# Patient Record
Sex: FEMALE | Race: WHITE | Employment: UNEMPLOYED | URBAN - METROPOLITAN AREA
[De-identification: names, ages, dates, MRNs, and addresses within clinical notes are randomized per-mention and may not be internally consistent; named-entity substitution may affect disease eponyms.]

---

## 2019-05-20 ENCOUNTER — APPOINTMENT (OUTPATIENT)
Dept: CT IMAGING | Age: 39
DRG: 690 | End: 2019-05-20

## 2019-05-20 ENCOUNTER — HOSPITAL ENCOUNTER (INPATIENT)
Age: 39
LOS: 3 days | Discharge: HOME | DRG: 690 | End: 2019-05-23
Attending: EMERGENCY MEDICINE | Admitting: INTERNAL MEDICINE

## 2019-05-20 DIAGNOSIS — N10 ACUTE PYELONEPHRITIS: Primary | ICD-10-CM

## 2019-05-20 PROBLEM — N12 PYELONEPHRITIS: Status: ACTIVE | Noted: 2019-05-20

## 2019-05-20 LAB
A/G RATIO: 1.3 (ref 1.1–2.2)
ALBUMIN SERPL-MCNC: 4.3 G/DL (ref 3.4–5)
ALP BLD-CCNC: 147 U/L (ref 40–129)
ALT SERPL-CCNC: 15 U/L (ref 10–40)
AMPHETAMINE SCREEN, URINE: ABNORMAL
ANION GAP SERPL CALCULATED.3IONS-SCNC: 15 MMOL/L (ref 3–16)
AST SERPL-CCNC: 22 U/L (ref 15–37)
BACTERIA: ABNORMAL /HPF
BARBITURATE SCREEN URINE: ABNORMAL
BASOPHILS ABSOLUTE: 0.1 K/UL (ref 0–0.2)
BASOPHILS RELATIVE PERCENT: 0.4 %
BENZODIAZEPINE SCREEN, URINE: ABNORMAL
BILIRUB SERPL-MCNC: 1.3 MG/DL (ref 0–1)
BILIRUBIN URINE: NEGATIVE
BLOOD, URINE: ABNORMAL
BUN BLDV-MCNC: 11 MG/DL (ref 7–20)
CALCIUM SERPL-MCNC: 9.5 MG/DL (ref 8.3–10.6)
CANNABINOID SCREEN URINE: POSITIVE
CHLORIDE BLD-SCNC: 94 MMOL/L (ref 99–110)
CLARITY: ABNORMAL
CO2: 22 MMOL/L (ref 21–32)
COCAINE METABOLITE SCREEN URINE: ABNORMAL
COLOR: ABNORMAL
CREAT SERPL-MCNC: 0.5 MG/DL (ref 0.6–1.1)
EOSINOPHILS ABSOLUTE: 0 K/UL (ref 0–0.6)
EOSINOPHILS RELATIVE PERCENT: 0 %
EPITHELIAL CELLS, UA: 1 /HPF (ref 0–5)
ESTIMATED AVERAGE GLUCOSE: 243.2 MG/DL
GFR AFRICAN AMERICAN: >60
GFR NON-AFRICAN AMERICAN: >60
GLOBULIN: 3.2 G/DL
GLUCOSE BLD-MCNC: 230 MG/DL (ref 70–99)
GLUCOSE BLD-MCNC: 231 MG/DL (ref 70–99)
GLUCOSE BLD-MCNC: 237 MG/DL (ref 70–99)
GLUCOSE BLD-MCNC: 290 MG/DL (ref 70–99)
GLUCOSE BLD-MCNC: 358 MG/DL (ref 70–99)
GLUCOSE BLD-MCNC: 361 MG/DL (ref 70–99)
GLUCOSE BLD-MCNC: 404 MG/DL (ref 70–99)
GLUCOSE URINE: 250 MG/DL
HBA1C MFR BLD: 10.1 %
HCG(URINE) PREGNANCY TEST: NEGATIVE
HCT VFR BLD CALC: 40.4 % (ref 36–48)
HEMOGLOBIN: 14 G/DL (ref 12–16)
HYALINE CASTS: 3 /LPF (ref 0–8)
KETONES, URINE: >=80 MG/DL
LEUKOCYTE ESTERASE, URINE: ABNORMAL
LYMPHOCYTES ABSOLUTE: 0.6 K/UL (ref 1–5.1)
LYMPHOCYTES RELATIVE PERCENT: 2.3 %
Lab: ABNORMAL
MCH RBC QN AUTO: 34.4 PG (ref 26–34)
MCHC RBC AUTO-ENTMCNC: 34.5 G/DL (ref 31–36)
MCV RBC AUTO: 99.7 FL (ref 80–100)
METHADONE SCREEN, URINE: ABNORMAL
MICROSCOPIC EXAMINATION: YES
MONOCYTES ABSOLUTE: 0.7 K/UL (ref 0–1.3)
MONOCYTES RELATIVE PERCENT: 2.8 %
NEUTROPHILS ABSOLUTE: 22.6 K/UL (ref 1.7–7.7)
NEUTROPHILS RELATIVE PERCENT: 94.5 %
NITRITE, URINE: NEGATIVE
OPIATE SCREEN URINE: ABNORMAL
OXYCODONE URINE: ABNORMAL
PDW BLD-RTO: 12.8 % (ref 12.4–15.4)
PERFORMED ON: ABNORMAL
PH UA: 8.5
PH UA: 8.5 (ref 5–8)
PHENCYCLIDINE SCREEN URINE: ABNORMAL
PLATELET # BLD: 359 K/UL (ref 135–450)
PMV BLD AUTO: 7.4 FL (ref 5–10.5)
POTASSIUM SERPL-SCNC: 3.5 MMOL/L (ref 3.5–5.1)
PROPOXYPHENE SCREEN: ABNORMAL
PROTEIN UA: >=300 MG/DL
RBC # BLD: 4.05 M/UL (ref 4–5.2)
RBC UA: 47 /HPF (ref 0–4)
SODIUM BLD-SCNC: 131 MMOL/L (ref 136–145)
SPECIFIC GRAVITY UA: 1.02 (ref 1–1.03)
TOTAL PROTEIN: 7.5 G/DL (ref 6.4–8.2)
URINE REFLEX TO CULTURE: YES
URINE TYPE: ABNORMAL
UROBILINOGEN, URINE: 0.2 E.U./DL
WBC # BLD: 24 K/UL (ref 4–11)
WBC UA: >900 /HPF (ref 0–5)

## 2019-05-20 PROCEDURE — 83036 HEMOGLOBIN GLYCOSYLATED A1C: CPT

## 2019-05-20 PROCEDURE — 87086 URINE CULTURE/COLONY COUNT: CPT

## 2019-05-20 PROCEDURE — 80307 DRUG TEST PRSMV CHEM ANLYZR: CPT

## 2019-05-20 PROCEDURE — 87077 CULTURE AEROBIC IDENTIFY: CPT

## 2019-05-20 PROCEDURE — 6370000000 HC RX 637 (ALT 250 FOR IP): Performed by: INTERNAL MEDICINE

## 2019-05-20 PROCEDURE — 74176 CT ABD & PELVIS W/O CONTRAST: CPT

## 2019-05-20 PROCEDURE — 2580000003 HC RX 258: Performed by: EMERGENCY MEDICINE

## 2019-05-20 PROCEDURE — 6360000002 HC RX W HCPCS: Performed by: PHYSICIAN ASSISTANT

## 2019-05-20 PROCEDURE — 6370000000 HC RX 637 (ALT 250 FOR IP): Performed by: PHYSICIAN ASSISTANT

## 2019-05-20 PROCEDURE — 85025 COMPLETE CBC W/AUTO DIFF WBC: CPT

## 2019-05-20 PROCEDURE — 96375 TX/PRO/DX INJ NEW DRUG ADDON: CPT

## 2019-05-20 PROCEDURE — 87186 SC STD MICRODIL/AGAR DIL: CPT

## 2019-05-20 PROCEDURE — 6360000002 HC RX W HCPCS: Performed by: EMERGENCY MEDICINE

## 2019-05-20 PROCEDURE — 1200000000 HC SEMI PRIVATE

## 2019-05-20 PROCEDURE — 99285 EMERGENCY DEPT VISIT HI MDM: CPT

## 2019-05-20 PROCEDURE — 80053 COMPREHEN METABOLIC PANEL: CPT

## 2019-05-20 PROCEDURE — 81001 URINALYSIS AUTO W/SCOPE: CPT

## 2019-05-20 PROCEDURE — 36415 COLL VENOUS BLD VENIPUNCTURE: CPT

## 2019-05-20 PROCEDURE — 96374 THER/PROPH/DIAG INJ IV PUSH: CPT

## 2019-05-20 PROCEDURE — 2580000003 HC RX 258: Performed by: INTERNAL MEDICINE

## 2019-05-20 PROCEDURE — 96361 HYDRATE IV INFUSION ADD-ON: CPT

## 2019-05-20 PROCEDURE — 84703 CHORIONIC GONADOTROPIN ASSAY: CPT

## 2019-05-20 PROCEDURE — 6360000002 HC RX W HCPCS: Performed by: INTERNAL MEDICINE

## 2019-05-20 RX ORDER — SODIUM CHLORIDE 0.9 % (FLUSH) 0.9 %
10 SYRINGE (ML) INJECTION PRN
Status: DISCONTINUED | OUTPATIENT
Start: 2019-05-20 | End: 2019-05-21 | Stop reason: SDUPTHER

## 2019-05-20 RX ORDER — ONDANSETRON 2 MG/ML
4 INJECTION INTRAMUSCULAR; INTRAVENOUS ONCE
Status: COMPLETED | OUTPATIENT
Start: 2019-05-20 | End: 2019-05-20

## 2019-05-20 RX ORDER — FAMOTIDINE 20 MG/1
20 TABLET, FILM COATED ORAL 2 TIMES DAILY
Status: DISCONTINUED | OUTPATIENT
Start: 2019-05-20 | End: 2019-05-21

## 2019-05-20 RX ORDER — 0.9 % SODIUM CHLORIDE 0.9 %
1000 INTRAVENOUS SOLUTION INTRAVENOUS ONCE
Status: COMPLETED | OUTPATIENT
Start: 2019-05-20 | End: 2019-05-20

## 2019-05-20 RX ORDER — KETOROLAC TROMETHAMINE 30 MG/ML
30 INJECTION, SOLUTION INTRAMUSCULAR; INTRAVENOUS EVERY 6 HOURS
Status: DISCONTINUED | OUTPATIENT
Start: 2019-05-20 | End: 2019-05-21

## 2019-05-20 RX ORDER — SODIUM CHLORIDE 9 MG/ML
INJECTION, SOLUTION INTRAVENOUS CONTINUOUS
Status: DISCONTINUED | OUTPATIENT
Start: 2019-05-20 | End: 2019-05-21

## 2019-05-20 RX ORDER — KETOROLAC TROMETHAMINE 30 MG/ML
30 INJECTION, SOLUTION INTRAMUSCULAR; INTRAVENOUS ONCE
Status: COMPLETED | OUTPATIENT
Start: 2019-05-20 | End: 2019-05-20

## 2019-05-20 RX ORDER — ONDANSETRON 2 MG/ML
4 INJECTION INTRAMUSCULAR; INTRAVENOUS EVERY 6 HOURS PRN
Status: DISCONTINUED | OUTPATIENT
Start: 2019-05-20 | End: 2019-05-23 | Stop reason: HOSPADM

## 2019-05-20 RX ORDER — SODIUM CHLORIDE 9 MG/ML
INJECTION, SOLUTION INTRAVENOUS
Status: DISPENSED
Start: 2019-05-20 | End: 2019-05-20

## 2019-05-20 RX ORDER — DEXTROSE MONOHYDRATE 25 G/50ML
12.5 INJECTION, SOLUTION INTRAVENOUS PRN
Status: DISCONTINUED | OUTPATIENT
Start: 2019-05-20 | End: 2019-05-22 | Stop reason: SDUPTHER

## 2019-05-20 RX ORDER — NICOTINE POLACRILEX 4 MG
15 LOZENGE BUCCAL PRN
Status: DISCONTINUED | OUTPATIENT
Start: 2019-05-20 | End: 2019-05-22 | Stop reason: SDUPTHER

## 2019-05-20 RX ORDER — SODIUM CHLORIDE 0.9 % (FLUSH) 0.9 %
10 SYRINGE (ML) INJECTION EVERY 12 HOURS SCHEDULED
Status: DISCONTINUED | OUTPATIENT
Start: 2019-05-20 | End: 2019-05-21 | Stop reason: SDUPTHER

## 2019-05-20 RX ORDER — DEXTROSE MONOHYDRATE 50 MG/ML
100 INJECTION, SOLUTION INTRAVENOUS PRN
Status: DISCONTINUED | OUTPATIENT
Start: 2019-05-20 | End: 2019-05-22 | Stop reason: SDUPTHER

## 2019-05-20 RX ADMIN — SODIUM CHLORIDE 1000 ML: 9 INJECTION, SOLUTION INTRAVENOUS at 01:21

## 2019-05-20 RX ADMIN — FAMOTIDINE 20 MG: 20 TABLET ORAL at 08:37

## 2019-05-20 RX ADMIN — INSULIN LISPRO 2 UNITS: 100 INJECTION, SOLUTION INTRAVENOUS; SUBCUTANEOUS at 21:22

## 2019-05-20 RX ADMIN — KETOROLAC TROMETHAMINE 30 MG: 30 INJECTION, SOLUTION INTRAMUSCULAR; INTRAVENOUS at 21:21

## 2019-05-20 RX ADMIN — SODIUM CHLORIDE: 9 INJECTION, SOLUTION INTRAVENOUS at 05:17

## 2019-05-20 RX ADMIN — INSULIN LISPRO 6 UNITS: 100 INJECTION, SOLUTION INTRAVENOUS; SUBCUTANEOUS at 12:32

## 2019-05-20 RX ADMIN — INSULIN LISPRO 10 UNITS: 100 INJECTION, SOLUTION INTRAVENOUS; SUBCUTANEOUS at 08:37

## 2019-05-20 RX ADMIN — KETOROLAC TROMETHAMINE 30 MG: 30 INJECTION, SOLUTION INTRAMUSCULAR at 01:23

## 2019-05-20 RX ADMIN — KETOROLAC TROMETHAMINE 30 MG: 30 INJECTION, SOLUTION INTRAMUSCULAR; INTRAVENOUS at 09:48

## 2019-05-20 RX ADMIN — Medication 1 G: at 04:24

## 2019-05-20 RX ADMIN — ONDANSETRON 4 MG: 2 INJECTION INTRAMUSCULAR; INTRAVENOUS at 09:48

## 2019-05-20 RX ADMIN — Medication 10 ML: at 21:22

## 2019-05-20 RX ADMIN — FAMOTIDINE 20 MG: 20 TABLET ORAL at 21:21

## 2019-05-20 RX ADMIN — KETOROLAC TROMETHAMINE 30 MG: 30 INJECTION, SOLUTION INTRAMUSCULAR; INTRAVENOUS at 16:11

## 2019-05-20 RX ADMIN — INSULIN LISPRO 6 UNITS: 100 INJECTION, SOLUTION INTRAVENOUS; SUBCUTANEOUS at 16:50

## 2019-05-20 RX ADMIN — ONDANSETRON 4 MG: 2 INJECTION INTRAMUSCULAR; INTRAVENOUS at 01:22

## 2019-05-20 RX ADMIN — SODIUM CHLORIDE: 9 INJECTION, SOLUTION INTRAVENOUS at 21:30

## 2019-05-20 RX ADMIN — ONDANSETRON 4 MG: 2 INJECTION INTRAMUSCULAR; INTRAVENOUS at 16:52

## 2019-05-20 RX ADMIN — INSULIN GLARGINE 5 UNITS: 100 INJECTION, SOLUTION SUBCUTANEOUS at 12:34

## 2019-05-20 ASSESSMENT — PAIN SCALES - GENERAL
PAINLEVEL_OUTOF10: 4
PAINLEVEL_OUTOF10: 4
PAINLEVEL_OUTOF10: 8
PAINLEVEL_OUTOF10: 4
PAINLEVEL_OUTOF10: 6
PAINLEVEL_OUTOF10: 4
PAINLEVEL_OUTOF10: 3

## 2019-05-20 ASSESSMENT — PAIN DESCRIPTION - LOCATION
LOCATION: FLANK
LOCATION: BACK;FLANK
LOCATION: BACK;FLANK

## 2019-05-20 ASSESSMENT — PAIN DESCRIPTION - FREQUENCY
FREQUENCY: INTERMITTENT
FREQUENCY: INTERMITTENT

## 2019-05-20 ASSESSMENT — PAIN DESCRIPTION - ORIENTATION
ORIENTATION: RIGHT

## 2019-05-20 ASSESSMENT — PAIN DESCRIPTION - PAIN TYPE
TYPE: ACUTE PAIN

## 2019-05-20 ASSESSMENT — PAIN DESCRIPTION - DESCRIPTORS
DESCRIPTORS: ACHING
DESCRIPTORS: ACHING

## 2019-05-20 NOTE — PROGRESS NOTES
Admission complete. Patient alert and oriented x4. Independent in room. No nausea at this time. Resting quietly in bed. The care plan and education has been reviewed and mutually agreed upon with the patient.      Electronically signed by Sharon Angelo RN on 5/20/2019 at 5:52 AM

## 2019-05-20 NOTE — PROGRESS NOTES
4 Eyes Skin Assessment     The patient is being assess for  Admission    I agree that 2 RN's have performed a thorough Head to Toe Skin Assessment on the patient. ALL assessment sites listed below have been assessed. Areas assessed by both nurses:   [x]   Head, Face, and Ears   [x]   Shoulders, Back, and Chest  [x]   Arms, Elbows, and Hands   [x]   Coccyx, Sacrum, and IschIum  [x]   Legs, Feet, and Heels        Does the Patient have Skin Breakdown?   No         Cody Prevention initiated:  No   Wound Care Orders initiated:  No      [facility] nurse consulted for Pressure Injury (Stage 3,4, Unstageable, DTI, NWPT, and Complex wounds), New and Established Ostomies:  No      Nurse 1 eSignature: Electronically signed by Ravi Weiss RN on 5/20/19 at 5:53 AM    **SHARE this note so that the co-signing nurse is able to place an eSignature**    Nurse 2 eSignature: Electronically signed by Eldon Walker RN on 5/20/2019 at 5:57 AM

## 2019-05-20 NOTE — PLAN OF CARE
Problem: Pain:  Goal: Pain level will decrease  Description  Pain level will decrease  Outcome: Ongoing  Goal: Control of acute pain  Description  Control of acute pain  Outcome: Ongoing  Goal: Control of chronic pain  Description  Control of chronic pain  Outcome: Ongoing     Problem: Falls - Risk of:  Goal: Will remain free from falls  Description  Will remain free from falls  Outcome: Ongoing  Goal: Absence of physical injury  Description  Absence of physical injury  Outcome: Ongoing     Problem: Serum Glucose Level - Abnormal:  Goal: Ability to maintain appropriate glucose levels will improve  Description  Ability to maintain appropriate glucose levels will improve  Outcome: Ongoing     Problem: Sensory Perception - Impaired:  Goal: Ability to maintain a stable neurologic state will improve  Description  Ability to maintain a stable neurologic state will improve  Outcome: Ongoing     Problem: Sensory:  Goal: General experience of comfort will improve  Description  General experience of comfort will improve  Outcome: Ongoing     Problem: Urinary Elimination:  Goal: Signs and symptoms of infection will decrease  Description  Signs and symptoms of infection will decrease  Outcome: Ongoing  Goal: Ability to reestablish a normal urinary elimination pattern will improve - after catheter removal  Description  Ability to reestablish a normal urinary elimination pattern will improve  Outcome: Ongoing

## 2019-05-20 NOTE — ED PROVIDER NOTES
[address] [facility]  [facility]      Pt Name: Heladio Du  MRN: [id number]  [place] 1980  Date ofevaluation: 5/19/2019  Provider: Marta Desai, [address] [place] [address]       Chief Complaint   Patient presents with    Flank Pain     Patient presents to ER with complaints of right flank pain with nausea/vomiting. HISTORY OF PRESENT ILLNESS   (Location/Symptom, Timing/Onset,Context/Setting, Quality, Duration, Modifying Factors, Severity)  Note limiting factors. HPI    Heladio Du is a 34 y.o. female who presents to the emergency department with a chief complaint of right flank and right lower quadrant abdominal pain that started tonight at around 1500. is gradually worsened . is an 8 at 10 intensity pain associated nausea, vomiting multiple times. no diarrhea or constipation . no dysuria hematuria urgency or frequency or urination. sHe states she  Has been feeling some twinges her back over the last 2 days time but developed abdominal pain today. she is diabetic        Nursing Notes were reviewed. REVIEW OF SYSTEMS    (2-9 systems for level 4, 10 or more for level 5)     Review of Systems  General:Denies fever, headache  ENT: no runny nose, sore throat   Respiratory:  no cough, chest congestion or shortness of breath  Cardiovascular: no chest pain or palpitations  GI: no  constipation or diarrhea  : no dysuria, urgency, frequency  Musculoskelatal: no neck pain  Neurological:  No weakness, numbness no speech or memory problems. Remainder of systems reviewed and negative. Nursing notes reviewed and I agree, except as otherwise noted. vitals signs reviewed. Allergies, Past Medical and Surgical History reviewed. Social andFamily History reviewed. and I agree, except as otherwise noted       Except as noted above the remainder of the review of systems was reviewed and negative.        PAST MEDICAL HISTORY     Past Medical History:   Diagnosis Date  Diabetes mellitus ([facility] [address]          SURGICAL HISTORY       Past Surgical History:   Procedure Laterality Date     SECTION           CURRENT MEDICATIONS       Previous Medications    INSULIN DETEMIR (LEVEMIR SC)    Inject into the skin       ALLERGIES     Lisinopril    FAMILY HISTORY     History reviewed. No pertinent family history.        SOCIAL HISTORY       Social History     Socioeconomic History    Marital status:      Spouse name: None    Number of children: None    Years of education: None    Highest education level: None   Occupational History    None   Social Needs    Financial resource strain: None    Food insecurity:     Worry: None     Inability: None    Transportation needs:     Medical: None     Non-medical: None   Tobacco Use    Smoking status: Current Every Day Smoker     Packs/day: 0.50     Types: Cigarettes    Smokeless tobacco: Never Used   Substance and Sexual Activity    Alcohol use: Yes     Comment: occasional    Drug use: No    Sexual activity: None   Lifestyle    Physical activity:     Days per week: None     Minutes per session: None    Stress: None   Relationships    Social connections:     Talks on phone: None     Gets together: None     Attends Evangelical service: None     Active member of club or organization: None     Attends meetings of clubs or organizations: None     Relationship status: None    Intimate partner violence:     Fear of current or ex partner: None     Emotionally abused: None     Physically abused: None     Forced sexual activity: None   Other Topics Concern    None   Social History Narrative    None       SCREENINGS             PHYSICAL EXAM    (up to 7 for level 4, 8 or more for level 5)     ED Triage Vitals [19 0008]   BP Temp Temp Source Pulse Resp SpO2 Height Weight   (!) 157/73 98.1 °F (36.7 °C) Oral 90 18 97 % 5' 1\" (1.549 m) 110 lb (49.9 kg)       Physical Exam  GENERAL: Patient appeared well-developed, well-nourished, vital signs reviewed. MENTAL STATUS: Patient is awake and alert   HEAD AND FACE :Head is normal cephalic. Face normal appearance  EYES: eyes lids and conjunctiva appear normal. Pupils reactive  ENT :ears and nose appear normal externally. CV: Heart regular rate and rhythm without murmur,  LUNGS: Lungs are clear to auscultation without wheezing, rales, or rhonchi. Normal respiratory effort. CHEST WALL: normal appearance. normal motion  ABDOMEN: Abdomen is soft to palpation. Right lower quadrant tenderness to palpation. No guarding rigidity or rebound Bowel sounds are present in all 4 quadrants No masses palpable. No CV tenderness present. No Bruits present. EXTREMITIES: Extremities moves all 4 Extremities without any weakness  There is no calf tenderness or lower extremity edema   Back: there is right paravertebral muscle tenderness to palpation. there is no spinous process tenderness to palpation. PSYCHIATRIC:mood and affect normal    NEUROLOGIC: no weakness or numbness noted,  no meningeal signs      This is a computerized dictation. I have made every effort to proofread this chart, however, transcription errors may exist.   Diagnosis  Acute pyelonephritis    Interpretation per the Radiologist below, if available at the time of this note:    [address]    (Results Pending)           LABS:  Labs Reviewed   URINE RT REFLEX TO CULTURE   PREGNANCY, URINE   COMPREHENSIVE METABOLIC PANEL   CBC WITH AUTO DIFFERENTIAL   URINE DRUG SCREEN       All other labs were within normal range or not returned as of this dictation. EMERGENCY DEPARTMENT COURSE and DIFFERENTIAL DIAGNOSIS/MDM:   Vitals:    Vitals:    05/20/19 0008   BP: (!) 157/73   Pulse: 90   Resp: 18   Temp: 98.1 °F (36.7 °C)   TempSrc: Oral   SpO2: 97%   Weight: 110 lb (49.9 kg)   Height: 5' 1\" (1.549 m)           MDM  Patient presents with flank pain nausea vomiting.   White count elevated 24,000 she has urinary tract infection probable pyelonephritis. Will contact the hospitalist for admission and further treatment    REASSESSMENT        CONSULTS:  None    Procedures:  Unless otherwise noted below, none     Procedures    FINAL IMPRESSION    Urinary tract infection, pyelonephritis    No diagnosis found. DISPOSITION/PLAN   DISPOSITION        PATIENT REFERRED TO:  No follow-up provider specified.     DISCHARGE MEDICATIONS:  New Prescriptions    No medications on file          (Please note that portions of this note were completed with a voice recognition program.  Efforts were made to edit thedictations but occasionally words are mis-transcribed.)    Warren Serna DO (electronically signed)Emergency Physician       Warren Serna DO  05/20/19 [address] [place] [address]  05/29/19 3732

## 2019-05-20 NOTE — PROGRESS NOTES
Assessment completed and documented. Pt resting in bed. No needs at thid time. Call light in reach will monitor.

## 2019-05-20 NOTE — ED NOTES
Pt a, a/o x 3, resps easy. Reports pain in right lower back remains at 4/10 and with no further n/v.  VS as noted. To be admitted.      Edith Gross RN  05/20/19 3129

## 2019-05-20 NOTE — ED NOTES
Pt a, a/o x 3, resps easy, states, \"That's starting to do the trick\" and reports pain down to a 4/10 at present with no n/v.  States, \"It's coming down nicely. \"  VSS as noted.      Xochilt Correa RN  05/20/19 1066

## 2019-05-20 NOTE — ED NOTES
Pt reports pain is \"going down and is a 5/10\" with no further n/v. To CT via wheelchair accompanied by [facility], [address].        Haylie Metcalf RN  05/20/19 7944

## 2019-05-20 NOTE — H&P
Hospital Medicine History & Physical      PCP: No primary care provider on file. Date of Admission: 2019    Date of Service: Pt seen/examined on 2019and Admitted to Inpatient    Chief Complaint:  Right flank pain      History Of Present Illness:    Karly Cortez is a 34 y.o. female who presents to the emergency department with a chief complaint of right flank and right lower quadrant abdominal pain that started tonight at around 1500. is gradually worsened . is an 8 at 10 intensity pain associated nausea, vomiting multiple times. no diarrhea or constipation . no dysuria hematuria urgency or frequency or urination. sHe states she  Has been feeling some twinges her back over the last 2 days time but developed abdominal pain today. she is diabetic and takes insulin at home       Past Medical History:        Diagnosis Date    Diabetes mellitus ([address]        Past Surgical History:        Procedure Laterality Date     SECTION         Medications Prior to Admission:    Prior to Admission medications    Medication Sig Start Date End Date Taking? Authorizing Provider   Insulin Detemir (LEVEMIR SC) Inject into the skin   Yes Historical Provider, MD       Allergies:  Lisinopril    Social History:  The patient currently lives at home     TOBACCO:   reports that she has been smoking cigarettes. She has been smoking about 0.50 packs per day. She has never used smokeless tobacco.  ETOH:   reports that she drinks alcohol. Family History:  Reviewed in detail and negative for DM, Early CAD, Cancer, CVA. Positive as follows:    History reviewed. No pertinent family history. REVIEW OF SYSTEMS:   Positive for flank pain, fever, and reportedly some nausea and vomiting. and as noted in the HPI. All other systems reviewed and negative.     PHYSICAL EXAM:    /76 Pulse 94   Temp 98.2 °F (36.8 °C) (Tympanic)   Resp 16   Ht 5' 1\" (1.549 m)   Wt 110 lb (49.9 kg)   LMP 05/08/2019   SpO2 96%   BMI 20.78 kg/m²     General appearance: No apparent distress appears stated age and cooperative. HEENT Normal cephalic, atraumatic without obvious deformity. Pupils equal, round, and reactive to light. Extra ocular muscles intact. Conjunctivae/corneas clear. Neck: Supple, No jugular venous distention/bruits. Trachea midline without thyromegaly or adenopathy with full range of motion. Lungs: Clear to auscultation, bilaterally without Rales/Wheezes/Rhonchi with good respiratory effort. Heart: Regular rate and rhythm with Normal S1/S2 without murmurs, rubs or gallops, point of maximum impulse non-displaced  Abdomen: Soft, Mildly tender on the right and the right flank  Extremities: No clubbing, cyanosis, or edema bilaterally. Full range of motion without deformity and normal gait intact. Skin: Skin color, texture, turgor normal.  No rashes or lesions. Neurologic: Alert and oriented X 3, neurovascularly intact with sensory/motor intact upper extremities/lower extremities, bilaterally. Cranial nerves: II-XII intact, grossly non-focal.  Mental status: Alert, oriented, thought content appropriate. Capillary Refill: Acceptable  < 3 seconds  Peripheral Pulses: +3 Easily felt, not easily obliterated with pressure      CXR:  I have reviewed the CXR with the following interpretation: clear   CT scan  - no sign of kidney stones  EKG:  I have reviewed the EKG with the following interpretation: none    CBC   Recent Labs     05/20/19  0120   WBC 24.0*   HGB 14.0   HCT 40.4         RENAL  Recent Labs     05/20/19  0120   *   K 3.5   CL 94*   CO2 22   BUN 11   CREATININE 0.5*     LFT'S  Recent Labs     05/20/19  0120   AST 22   ALT 15   BILITOT 1.3*   ALKPHOS 147*     COAG  No results for input(s): INR in the last 72 hours.   CARDIAC ENZYMES  No results for input(s): CKTOTAL, CKMB, CKMBINDEX, TROPONINI in the last 72 hours. U/A:    Lab Results   Component Value Date    COLORU DK YELLOW 05/20/2019    WBCUA >900 05/20/2019    RBCUA 47 05/20/2019    BACTERIA 4+ 05/20/2019    CLARITYU TURBID 05/20/2019    SPECGRAV 1.021 05/20/2019    LEUKOCYTESUR LARGE 05/20/2019    BLOODU MODERATE 05/20/2019    GLUCOSEU 250 05/20/2019       ABG  No results found for: HKH4BRC, BEART, W6ACKPEI, PHART, THGBART, TDJ6RMW, PO2ART, WAU2UIZ        Active Hospital Problems    Diagnosis Date Noted    Pyelonephritis [N12] 05/20/2019         PHYSICIANS CERTIFICATION:    I certify that Hector Parmar is expected to be hospitalized for greater than 2 midnights based on the following assessment and plan:      ASSESSMENT/PLAN:    1. Acute pyelonephritis. - flank pain, elevated white count, severe UTI on urine dip  - clinically has despite negative ct scan  - started on IV rocephin  - await urine culture and sensitivity    2. Diabetes mellitus  - she takes levemir at home. - started on sliding scale in house. - she will need her levemir started     DVT Prophylaxis: lovenox  Diet: DIET CARB CONTROL;  Code Status: Full Code  PT/OT Eval Status: not at this time     Dispo - inpatient       Allyson Omer MD    Thank you No primary care provider on file. for the opportunity to be involved in this patient's care. If you have any questions or concerns please feel free to contact me at [phone].

## 2019-05-21 PROBLEM — D72.829 LEUKOCYTOSIS: Status: ACTIVE | Noted: 2019-05-21

## 2019-05-21 PROBLEM — A41.9 SEPSIS (HCC): Status: ACTIVE | Noted: 2019-05-21

## 2019-05-21 PROBLEM — E87.5 HYPERKALEMIA: Status: ACTIVE | Noted: 2019-05-21

## 2019-05-21 PROBLEM — E10.10 DKA, TYPE 1 (HCC): Status: ACTIVE | Noted: 2019-05-21

## 2019-05-21 PROBLEM — R11.2 INTRACTABLE NAUSEA AND VOMITING: Status: ACTIVE | Noted: 2019-05-21

## 2019-05-21 LAB
ANION GAP SERPL CALCULATED.3IONS-SCNC: 10 MMOL/L (ref 3–16)
ANION GAP SERPL CALCULATED.3IONS-SCNC: 15 MMOL/L (ref 3–16)
ANION GAP SERPL CALCULATED.3IONS-SCNC: 18 MMOL/L (ref 3–16)
ANION GAP SERPL CALCULATED.3IONS-SCNC: 25 MMOL/L (ref 3–16)
ANION GAP SERPL CALCULATED.3IONS-SCNC: 30 MMOL/L (ref 3–16)
BASE EXCESS ARTERIAL: -31.2 MMOL/L (ref -3–3)
BETA-HYDROXYBUTYRATE: >8 MMOL/L (ref 0–0.27)
BUN BLDV-MCNC: 13 MG/DL (ref 7–20)
BUN BLDV-MCNC: 15 MG/DL (ref 7–20)
BUN BLDV-MCNC: 20 MG/DL (ref 7–20)
BUN BLDV-MCNC: 21 MG/DL (ref 7–20)
BUN BLDV-MCNC: 22 MG/DL (ref 7–20)
CALCIUM SERPL-MCNC: 7.9 MG/DL (ref 8.3–10.6)
CALCIUM SERPL-MCNC: 8.1 MG/DL (ref 8.3–10.6)
CALCIUM SERPL-MCNC: 8.3 MG/DL (ref 8.3–10.6)
CALCIUM SERPL-MCNC: 8.5 MG/DL (ref 8.3–10.6)
CALCIUM SERPL-MCNC: 8.7 MG/DL (ref 8.3–10.6)
CARBOXYHEMOGLOBIN ARTERIAL: 0.7 % (ref 0–1.5)
CHLORIDE BLD-SCNC: 100 MMOL/L (ref 99–110)
CHLORIDE BLD-SCNC: 107 MMOL/L (ref 99–110)
CHLORIDE BLD-SCNC: 109 MMOL/L (ref 99–110)
CHLORIDE BLD-SCNC: 109 MMOL/L (ref 99–110)
CHLORIDE BLD-SCNC: 95 MMOL/L (ref 99–110)
CO2: 12 MMOL/L (ref 21–32)
CO2: 6 MMOL/L (ref 21–32)
CO2: 8 MMOL/L (ref 21–32)
CO2: <2 MMOL/L (ref 21–32)
CO2: <2 MMOL/L (ref 21–32)
CREAT SERPL-MCNC: 0.7 MG/DL (ref 0.6–1.1)
CREAT SERPL-MCNC: 0.7 MG/DL (ref 0.6–1.1)
CREAT SERPL-MCNC: 0.8 MG/DL (ref 0.6–1.1)
CREAT SERPL-MCNC: 0.9 MG/DL (ref 0.6–1.1)
CREAT SERPL-MCNC: 1 MG/DL (ref 0.6–1.1)
GFR AFRICAN AMERICAN: >60
GFR NON-AFRICAN AMERICAN: >60
GLUCOSE BLD-MCNC: 127 MG/DL (ref 70–99)
GLUCOSE BLD-MCNC: 148 MG/DL (ref 70–99)
GLUCOSE BLD-MCNC: 150 MG/DL (ref 70–99)
GLUCOSE BLD-MCNC: 154 MG/DL (ref 70–99)
GLUCOSE BLD-MCNC: 157 MG/DL (ref 70–99)
GLUCOSE BLD-MCNC: 171 MG/DL (ref 70–99)
GLUCOSE BLD-MCNC: 174 MG/DL (ref 70–99)
GLUCOSE BLD-MCNC: 174 MG/DL (ref 70–99)
GLUCOSE BLD-MCNC: 177 MG/DL (ref 70–99)
GLUCOSE BLD-MCNC: 179 MG/DL (ref 70–99)
GLUCOSE BLD-MCNC: 187 MG/DL (ref 70–99)
GLUCOSE BLD-MCNC: 204 MG/DL (ref 70–99)
GLUCOSE BLD-MCNC: 205 MG/DL (ref 70–99)
GLUCOSE BLD-MCNC: 253 MG/DL (ref 70–99)
GLUCOSE BLD-MCNC: 340 MG/DL (ref 70–99)
GLUCOSE BLD-MCNC: 397 MG/DL (ref 70–99)
GLUCOSE BLD-MCNC: 406 MG/DL (ref 70–99)
GLUCOSE BLD-MCNC: 436 MG/DL (ref 70–99)
GLUCOSE BLD-MCNC: 445 MG/DL (ref 70–99)
HCO3 ARTERIAL: 1.6 MMOL/L (ref 21–29)
HCT VFR BLD CALC: 44.3 % (ref 36–48)
HEMOGLOBIN, ART, EXTENDED: 12.8 G/DL (ref 12–16)
HEMOGLOBIN: 13.7 G/DL (ref 12–16)
LACTIC ACID, SEPSIS: 1.8 MMOL/L (ref 0.4–1.9)
MAGNESIUM: 1.9 MG/DL (ref 1.8–2.4)
MAGNESIUM: 2.1 MG/DL (ref 1.8–2.4)
MAGNESIUM: 2.2 MG/DL (ref 1.8–2.4)
MCH RBC QN AUTO: 33.8 PG (ref 26–34)
MCHC RBC AUTO-ENTMCNC: 30.9 G/DL (ref 31–36)
MCV RBC AUTO: 109.4 FL (ref 80–100)
METHEMOGLOBIN ARTERIAL: 0.3 %
O2 CONTENT ARTERIAL: 18 ML/DL
O2 SAT, ARTERIAL: 98.3 %
O2 THERAPY: ABNORMAL
PCO2 ARTERIAL: ABNORMAL MMHG (ref 35–45)
PDW BLD-RTO: 13.9 % (ref 12.4–15.4)
PERFORMED ON: ABNORMAL
PH ARTERIAL: 6.83 (ref 7.35–7.45)
PHOSPHORUS: 0.6 MG/DL (ref 2.5–4.9)
PLATELET # BLD: 374 K/UL (ref 135–450)
PMV BLD AUTO: 8.1 FL (ref 5–10.5)
PO2 ARTERIAL: 164 MMHG (ref 75–108)
POTASSIUM REFLEX MAGNESIUM: 6.3 MMOL/L (ref 3.5–5.1)
POTASSIUM REFLEX MAGNESIUM: 6.3 MMOL/L (ref 3.5–5.1)
POTASSIUM SERPL-SCNC: 4.5 MMOL/L (ref 3.5–5.1)
POTASSIUM SERPL-SCNC: 5 MMOL/L (ref 3.5–5.1)
POTASSIUM SERPL-SCNC: 5.1 MMOL/L (ref 3.5–5.1)
RBC # BLD: 4.05 M/UL (ref 4–5.2)
REASON FOR REJECTION: NORMAL
REJECTED TEST: NORMAL
SODIUM BLD-SCNC: 127 MMOL/L (ref 136–145)
SODIUM BLD-SCNC: 127 MMOL/L (ref 136–145)
SODIUM BLD-SCNC: 130 MMOL/L (ref 136–145)
SODIUM BLD-SCNC: 131 MMOL/L (ref 136–145)
SODIUM BLD-SCNC: 133 MMOL/L (ref 136–145)
TCO2 ARTERIAL: 4.2 MMOL/L
WBC # BLD: 31.3 K/UL (ref 4–11)

## 2019-05-21 PROCEDURE — 6360000002 HC RX W HCPCS: Performed by: INTERNAL MEDICINE

## 2019-05-21 PROCEDURE — 2580000003 HC RX 258

## 2019-05-21 PROCEDURE — 2500000003 HC RX 250 WO HCPCS: Performed by: INTERNAL MEDICINE

## 2019-05-21 PROCEDURE — 2580000003 HC RX 258: Performed by: PHYSICIAN ASSISTANT

## 2019-05-21 PROCEDURE — 6360000002 HC RX W HCPCS: Performed by: PHYSICIAN ASSISTANT

## 2019-05-21 PROCEDURE — 36415 COLL VENOUS BLD VENIPUNCTURE: CPT

## 2019-05-21 PROCEDURE — 2000000000 HC ICU R&B

## 2019-05-21 PROCEDURE — C1751 CATH, INF, PER/CENT/MIDLINE: HCPCS

## 2019-05-21 PROCEDURE — 85027 COMPLETE CBC AUTOMATED: CPT

## 2019-05-21 PROCEDURE — C9113 INJ PANTOPRAZOLE SODIUM, VIA: HCPCS | Performed by: INTERNAL MEDICINE

## 2019-05-21 PROCEDURE — 82010 KETONE BODYS QUAN: CPT

## 2019-05-21 PROCEDURE — 6370000000 HC RX 637 (ALT 250 FOR IP): Performed by: INTERNAL MEDICINE

## 2019-05-21 PROCEDURE — 84100 ASSAY OF PHOSPHORUS: CPT

## 2019-05-21 PROCEDURE — 80048 BASIC METABOLIC PNL TOTAL CA: CPT

## 2019-05-21 PROCEDURE — 83605 ASSAY OF LACTIC ACID: CPT

## 2019-05-21 PROCEDURE — 99255 IP/OBS CONSLTJ NEW/EST HI 80: CPT | Performed by: INTERNAL MEDICINE

## 2019-05-21 PROCEDURE — 83735 ASSAY OF MAGNESIUM: CPT

## 2019-05-21 PROCEDURE — 2580000003 HC RX 258: Performed by: INTERNAL MEDICINE

## 2019-05-21 PROCEDURE — 36569 INSJ PICC 5 YR+ W/O IMAGING: CPT

## 2019-05-21 PROCEDURE — 82803 BLOOD GASES ANY COMBINATION: CPT

## 2019-05-21 PROCEDURE — 87040 BLOOD CULTURE FOR BACTERIA: CPT

## 2019-05-21 PROCEDURE — 36600 WITHDRAWAL OF ARTERIAL BLOOD: CPT

## 2019-05-21 PROCEDURE — 02HV33Z INSERTION OF INFUSION DEVICE INTO SUPERIOR VENA CAVA, PERCUTANEOUS APPROACH: ICD-10-PCS | Performed by: INTERNAL MEDICINE

## 2019-05-21 PROCEDURE — 6370000000 HC RX 637 (ALT 250 FOR IP): Performed by: PHYSICIAN ASSISTANT

## 2019-05-21 RX ORDER — LIDOCAINE HYDROCHLORIDE 10 MG/ML
5 INJECTION, SOLUTION EPIDURAL; INFILTRATION; INTRACAUDAL; PERINEURAL ONCE
Status: DISCONTINUED | OUTPATIENT
Start: 2019-05-21 | End: 2019-05-23 | Stop reason: HOSPADM

## 2019-05-21 RX ORDER — SODIUM CHLORIDE 0.9 % (FLUSH) 0.9 %
10 SYRINGE (ML) INJECTION PRN
Status: DISCONTINUED | OUTPATIENT
Start: 2019-05-21 | End: 2019-05-21 | Stop reason: SDUPTHER

## 2019-05-21 RX ORDER — SODIUM CHLORIDE 9 MG/ML
INJECTION, SOLUTION INTRAVENOUS CONTINUOUS
Status: DISCONTINUED | OUTPATIENT
Start: 2019-05-21 | End: 2019-05-22

## 2019-05-21 RX ORDER — SODIUM CHLORIDE 0.9 % (FLUSH) 0.9 %
10 SYRINGE (ML) INJECTION EVERY 12 HOURS SCHEDULED
Status: DISCONTINUED | OUTPATIENT
Start: 2019-05-21 | End: 2019-05-21 | Stop reason: SDUPTHER

## 2019-05-21 RX ORDER — KETOROLAC TROMETHAMINE 30 MG/ML
30 INJECTION, SOLUTION INTRAMUSCULAR; INTRAVENOUS EVERY 6 HOURS
Status: CANCELLED | OUTPATIENT
Start: 2019-05-21 | End: 2019-05-21

## 2019-05-21 RX ORDER — 0.9 % SODIUM CHLORIDE 0.9 %
15 INTRAVENOUS SOLUTION INTRAVENOUS ONCE
Status: CANCELLED | OUTPATIENT
Start: 2019-05-21 | End: 2019-05-21

## 2019-05-21 RX ORDER — SODIUM CHLORIDE 0.9 % (FLUSH) 0.9 %
10 SYRINGE (ML) INJECTION PRN
Status: DISCONTINUED | OUTPATIENT
Start: 2019-05-21 | End: 2019-05-23 | Stop reason: HOSPADM

## 2019-05-21 RX ORDER — PANTOPRAZOLE SODIUM 40 MG/10ML
40 INJECTION, POWDER, LYOPHILIZED, FOR SOLUTION INTRAVENOUS 2 TIMES DAILY
Status: DISCONTINUED | OUTPATIENT
Start: 2019-05-21 | End: 2019-05-23 | Stop reason: HOSPADM

## 2019-05-21 RX ORDER — DEXTROSE AND SODIUM CHLORIDE 5; .45 G/100ML; G/100ML
INJECTION, SOLUTION INTRAVENOUS CONTINUOUS PRN
Status: CANCELLED | OUTPATIENT
Start: 2019-05-21

## 2019-05-21 RX ORDER — KETOROLAC TROMETHAMINE 30 MG/ML
30 INJECTION, SOLUTION INTRAMUSCULAR; INTRAVENOUS EVERY 6 HOURS PRN
Status: ACTIVE | OUTPATIENT
Start: 2019-05-21 | End: 2019-05-22

## 2019-05-21 RX ORDER — MAGNESIUM SULFATE 1 G/100ML
1 INJECTION INTRAVENOUS PRN
Status: CANCELLED | OUTPATIENT
Start: 2019-05-21

## 2019-05-21 RX ORDER — SODIUM CHLORIDE 0.9 % (FLUSH) 0.9 %
10 SYRINGE (ML) INJECTION EVERY 12 HOURS SCHEDULED
Status: DISCONTINUED | OUTPATIENT
Start: 2019-05-21 | End: 2019-05-23 | Stop reason: HOSPADM

## 2019-05-21 RX ORDER — DEXTROSE MONOHYDRATE 25 G/50ML
12.5 INJECTION, SOLUTION INTRAVENOUS PRN
Status: CANCELLED | OUTPATIENT
Start: 2019-05-21

## 2019-05-21 RX ORDER — POTASSIUM CHLORIDE 7.45 MG/ML
10 INJECTION INTRAVENOUS PRN
Status: DISCONTINUED | OUTPATIENT
Start: 2019-05-21 | End: 2019-05-23 | Stop reason: HOSPADM

## 2019-05-21 RX ORDER — SODIUM CHLORIDE 9 MG/ML
INJECTION, SOLUTION INTRAVENOUS CONTINUOUS
Status: CANCELLED | OUTPATIENT
Start: 2019-05-21

## 2019-05-21 RX ORDER — DEXTROSE MONOHYDRATE 25 G/50ML
12.5 INJECTION, SOLUTION INTRAVENOUS PRN
Status: DISCONTINUED | OUTPATIENT
Start: 2019-05-21 | End: 2019-05-22 | Stop reason: SDUPTHER

## 2019-05-21 RX ORDER — MAGNESIUM SULFATE 1 G/100ML
1 INJECTION INTRAVENOUS PRN
Status: DISCONTINUED | OUTPATIENT
Start: 2019-05-21 | End: 2019-05-23 | Stop reason: HOSPADM

## 2019-05-21 RX ORDER — DEXTROSE AND SODIUM CHLORIDE 5; .45 G/100ML; G/100ML
INJECTION, SOLUTION INTRAVENOUS CONTINUOUS PRN
Status: DISCONTINUED | OUTPATIENT
Start: 2019-05-21 | End: 2019-05-23 | Stop reason: HOSPADM

## 2019-05-21 RX ORDER — POTASSIUM CHLORIDE 7.45 MG/ML
10 INJECTION INTRAVENOUS PRN
Status: CANCELLED | OUTPATIENT
Start: 2019-05-21

## 2019-05-21 RX ORDER — PROMETHAZINE HYDROCHLORIDE 25 MG/ML
12.5 INJECTION, SOLUTION INTRAMUSCULAR; INTRAVENOUS EVERY 6 HOURS PRN
Status: DISCONTINUED | OUTPATIENT
Start: 2019-05-21 | End: 2019-05-23 | Stop reason: HOSPADM

## 2019-05-21 RX ORDER — PROMETHAZINE HYDROCHLORIDE 25 MG/ML
12.5 INJECTION, SOLUTION INTRAMUSCULAR; INTRAVENOUS EVERY 6 HOURS PRN
Status: DISCONTINUED | OUTPATIENT
Start: 2019-05-21 | End: 2019-05-21

## 2019-05-21 RX ORDER — SODIUM CHLORIDE 9 MG/ML
INJECTION, SOLUTION INTRAVENOUS
Status: COMPLETED
Start: 2019-05-21 | End: 2019-05-21

## 2019-05-21 RX ADMIN — SODIUM CHLORIDE 500 ML: 9 INJECTION, SOLUTION INTRAVENOUS at 10:22

## 2019-05-21 RX ADMIN — SODIUM BICARBONATE 50 MEQ: 84 INJECTION, SOLUTION INTRAVENOUS at 10:36

## 2019-05-21 RX ADMIN — SODIUM CHLORIDE 5.79 UNITS/HR: 9 INJECTION, SOLUTION INTRAVENOUS at 10:58

## 2019-05-21 RX ADMIN — POTASSIUM CHLORIDE 10 MEQ: 7.46 INJECTION, SOLUTION INTRAVENOUS at 16:07

## 2019-05-21 RX ADMIN — SODIUM CHLORIDE 1 G: 900 INJECTION INTRAVENOUS at 10:22

## 2019-05-21 RX ADMIN — POTASSIUM CHLORIDE 10 MEQ: 7.46 INJECTION, SOLUTION INTRAVENOUS at 18:22

## 2019-05-21 RX ADMIN — POTASSIUM CHLORIDE 10 MEQ: 7.46 INJECTION, SOLUTION INTRAVENOUS at 23:12

## 2019-05-21 RX ADMIN — ENOXAPARIN SODIUM 40 MG: 40 INJECTION SUBCUTANEOUS at 14:12

## 2019-05-21 RX ADMIN — KETOROLAC TROMETHAMINE 30 MG: 30 INJECTION, SOLUTION INTRAMUSCULAR; INTRAVENOUS at 03:07

## 2019-05-21 RX ADMIN — PANTOPRAZOLE SODIUM 40 MG: 40 INJECTION, POWDER, FOR SOLUTION INTRAVENOUS at 21:10

## 2019-05-21 RX ADMIN — Medication 1 G: at 04:32

## 2019-05-21 RX ADMIN — PROMETHAZINE HYDROCHLORIDE 12.5 MG: 25 INJECTION INTRAMUSCULAR; INTRAVENOUS at 08:04

## 2019-05-21 RX ADMIN — SODIUM CHLORIDE: 9 INJECTION, SOLUTION INTRAVENOUS at 09:59

## 2019-05-21 RX ADMIN — ONDANSETRON 4 MG: 2 INJECTION INTRAMUSCULAR; INTRAVENOUS at 08:21

## 2019-05-21 RX ADMIN — SODIUM CHLORIDE 1 G: 900 INJECTION INTRAVENOUS at 17:25

## 2019-05-21 RX ADMIN — ONDANSETRON 4 MG: 2 INJECTION INTRAMUSCULAR; INTRAVENOUS at 01:17

## 2019-05-21 RX ADMIN — PROMETHAZINE HYDROCHLORIDE 12.5 MG: 25 INJECTION INTRAMUSCULAR; INTRAVENOUS at 16:07

## 2019-05-21 RX ADMIN — DEXTROSE AND SODIUM CHLORIDE: 5; 450 INJECTION, SOLUTION INTRAVENOUS at 18:30

## 2019-05-21 RX ADMIN — INSULIN LISPRO 12 UNITS: 100 INJECTION, SOLUTION INTRAVENOUS; SUBCUTANEOUS at 08:06

## 2019-05-21 RX ADMIN — SODIUM PHOSPHATE, MONOBASIC, MONOHYDRATE 20 MMOL: 276; 142 INJECTION, SOLUTION INTRAVENOUS at 21:43

## 2019-05-21 RX ADMIN — Medication 10 ML: at 21:11

## 2019-05-21 RX ADMIN — POTASSIUM CHLORIDE 10 MEQ: 7.46 INJECTION, SOLUTION INTRAVENOUS at 21:57

## 2019-05-21 ASSESSMENT — PAIN SCALES - GENERAL
PAINLEVEL_OUTOF10: 4
PAINLEVEL_OUTOF10: 5
PAINLEVEL_OUTOF10: 0

## 2019-05-21 NOTE — PROGRESS NOTES
Advanced Care Planning Note. Purpose of Encounter: Advanced care planning in light of DKA Type 1 DM  Parties In Attendance: Patient  Decisional Capacity: Yes  Subjective: Patient with intractable nausea and R flank pain  Objective: Cr 1.0  Goals of Care Determination: Patient wants full support (CPR, vent, surgery, HD, trach, PEG)  Plan: Transfer to ICU. Insulin gtt, IVF, IV Abx, CCM consult  Code Status: Full code   Time spent on Advanced care Plannin minutes  Advanced Care Planning Documents: Completed advanced directives on chart,  is the POA.     Clementina Chadwick MD  2019 9:32 AM

## 2019-05-21 NOTE — PROGRESS NOTES
(GLUTOSE) 40 % oral gel 15 g PRN   dextrose 50 % solution 12.5 g PRN   glucagon (rDNA) injection 1 mg PRN   dextrose 5 % solution PRN       Objective:  BP (!) 154/84   Pulse 118   Temp 96.4 °F (35.8 °C) (Temporal)   Resp (!) 40   Ht 5' 1\" (1.549 m)   Wt 109 lb (49.4 kg)   LMP 05/08/2019   SpO2 100%   BMI 20.60 kg/m²     Intake/Output Summary (Last 24 hours) at 5/21/2019 1103  Last data filed at 5/21/2019 0656  Gross per 24 hour   Intake 1610 ml   Output --   Net 1610 ml      Wt Readings from Last 3 Encounters:   05/21/19 109 lb (49.4 kg)       General appearance:  Appears comfortable, awake, tachypneic, laying in fetal position  Eyes: Sclera clear. Pupils equal.  ENT: Dry oral mucosa. Trachea midline, no adenopathy. Cardiovascular: Tachycardia. No murmur. No edema in lower extremities  Respiratory: Not using accessory muscles. Good inspiratory effort. Clear to auscultation bilaterally, no wheeze or crackles. GI: Abdomen soft, no tenderness, not distended, normal bowel sounds  Musculoskeletal: Mild R CVA tenderness  Neurology: Grossly intact. No speech or motor deficits  Psych: Normal affect. Alert and oriented in time, place and person  Skin: Warm, dry, normal turgor  Extremity exam shows brisk capillary refill. Peripheral pulses are palpable in lower extremities     Labs and Tests:  CBC:   Recent Labs     05/20/19  0120 05/21/19  0631   WBC 24.0* 31.3*   HGB 14.0 13.7    374     BMP:    Recent Labs     05/20/19  0120 05/21/19  0844   * 127*  127*   K 3.5 6.3*  6.3*   CL 94* 100  95*   CO2 22 <2*  <2*   BUN 11 22*  21*   CREATININE 0.5* 1.0  0.9   GLUCOSE 230* 436*  445*     Hepatic:   Recent Labs     05/20/19  0120   AST 22   ALT 15   BILITOT 1.3*   ALKPHOS 147*     CT ABDOMEN PELVIS WO CONTRAST   Final Result   Negative noncontrast CT scan. No evidence for stone disease at this time.                Problem List  Principal Problem:    Pyelonephritis  Active Problems:    DKA, type 1 ([facility] [address]    Hyperkalemia    Intractable nausea and vomiting    Leukocytosis    Sepsis ([facility] [address]  Resolved Problems:    * No resolved hospital problems. *       Assessment & Plan:   1. NPO  2. Transfer to ICU for DKA  3. Insulin gtt via DKA protocol  4. IVF per DKA protocol  5. Change IV Abx to Merrem  6. Check Blood cx X 2 STAT that were not ordered in ED  7. Hold Lantus  8. [facility] consult for critical care  9. IV Toradol X 8 doses for pyelonephritis  10. Follow and replete lytes closely  11. Sepsis orders initiated    IV Access: Peripheral  Larson: No  Diet: Diet NPO Effective Now  Code:Full Code  DVT PPX Lovenox  Disposition Home    Discussed with patient, nursing and CM. Patient is critically ill and could worsen further. She is full code.       August Mcneil MD   5/21/2019 11:03 AM

## 2019-05-21 NOTE — CONSULTS
[facility] Pulmonary and Critical Care   Consult Note      Reason for Consult: Complicated UTI/DKA  Requesting Physician: Giovanni Jensen    Subjective:   CHIEF COMPLAINT: Right flank pain/nausea     HPI: Patient was originally admitted yesterday with right flank pain, thought to be related to UTI. Patient is also a known diabetic, poorly compliant with medications due to cost.  Noted to be extremely acidotic today, with worsening shortness of breath and noted to be in DKA. Hence transferred to ICU for management. Patient is awake and alert and is able to answer all questions appropriately. Had increased shortness of breath and work of breathing. Denies any chest pain, cough, phlegm or fevers.        The patient is a 34 y.o. female with significant past medical history of:      Diagnosis Date    Diabetes mellitus ([address]         Past Surgical History:        Procedure Laterality Date     SECTION       Current Medications:    Current Facility-Administered Medications: ketorolac (TORADOL) injection 30 mg, 30 mg, Intravenous, Q6H PRN  promethazine (PHENERGAN) injection 12.5 mg, 12.5 mg, Intravenous, Q6H PRN  meropenem (MERREM) 1 g in sodium chloride 0.9 % 100 mL IVPB (mini-bag), 1 g, Intravenous, Q8H  dextrose 50 % solution 12.5 g, 12.5 g, Intravenous, PRN  potassium chloride 10 mEq/100 mL IVPB (Peripheral Line), 10 mEq, Intravenous, PRN  magnesium sulfate 1 g in dextrose 5% 100 mL IVPB, 1 g, Intravenous, PRN  sodium phosphate 10 mmol in dextrose 5 % 250 mL IVPB, 10 mmol, Intravenous, PRN **OR** sodium phosphate 15 mmol in dextrose 5 % 250 mL IVPB, 15 mmol, Intravenous, PRN **OR** sodium phosphate 20 mmol in dextrose 5 % 500 mL IVPB, 20 mmol, Intravenous, PRN  insulin regular (HUMULIN R;NOVOLIN R) 100 Units in sodium chloride 0.9 % 100 mL infusion, 0.1 Units/kg/hr, Intravenous, Continuous  dextrose 5 % and 0.45 % sodium chloride infusion, , Intravenous, Continuous PRN  0.9 % sodium chloride infusion, , Intravenous, joints  Neurological: negative for dizziness, gait problems, headaches, seizures, speech problems, tremors and weakness  Behavioral/Psych: negative for anxiety, behavior problems, depression, fatigue and sleep disturbance  Endocrine: negative for diabetic symptoms including none, neuropathy, polyphagia, polyuria, polydipsia, vomiting and diarrhea and temperature intolerance  Allergic/Immunologic: negative for anaphylaxis, angioedema, hay fever and urticaria      Objective:     Patient Vitals for the past 8 hrs:   BP Temp Temp src Pulse Resp Weight   05/21/19 1200 (!) 121/59 -- -- 105 29 --   05/21/19 1100 (!) 118/53 -- -- 108 (!) 38 --   05/21/19 1000 123/60 -- -- 111 (!) 34 --   05/21/19 0952 -- 96.4 °F (35.8 °C) Temporal -- -- 109 lb (49.4 kg)   05/21/19 0900 -- -- -- -- (!) 40 --     I/O last 3 completed shifts: In: 4768 [I.V.:2810; IV Piggyback:100]  Out: 900 [Urine:900]  No intake/output data recorded.     Physical Exam:  General Appearance: alert and oriented to person, place and time, well developed and well- nourished, in no acute distress  Skin: warm and dry, no rash or erythema  Head: normocephalic and atraumatic  Eyes: pupils equal, round, and reactive to light, extraocular eye movements intact, conjunctivae normal  ENT: external ear and ear canal normal bilaterally, nose without deformity, nasal mucosa and turbinates normal  Neck: supple and non-tender without mass, no cervical lymphadenopathy  Pulmonary/Chest: clear to auscultation bilaterally- no wheezes, rales or rhonchi, normal air movement, no respiratory distress  Cardiovascular: normal rate, regular rhythm,  no murmurs, rubs, distal pulses intact, no carotid bruits  Abdomen: soft, non-tender, non-distended, normal bowel sounds, no masses or organomegaly  Lymph Nodes: Cervical, supraclavicular normal  Extremities: no cyanosis, clubbing or edema  Musculoskeletal: normal range of motion, no joint swelling, deformity or tenderness  Neurologic: alert, negative.       Peritoneum/Retroperitoneum: No adenopathy, mesenteric stranding or free   fluid.  Aortic caliber is normal.       Bones/Soft Tissues: Unremarkable.           Impression   Negative noncontrast CT scan.  No evidence for stone disease at this time. Problem List:   Complicated UTI  Severe anion gap Metabolic acidosis  Volume depletion  DKA  Assessment/Plan:     Reviewed lab work, which shows undetectable HCO3 and elevated anion gap of 30 with positive beta hydroxybutyrate. Apparently HCO3 was normal on admission. Blood sugars in 400 range. Picture is suggestive of DKA. Patient started on aggressive IV fluid resuscitation and IV insulin drip. Close monitoring of electrolytes and potassium. PICC line will be placed for IV access. Gram-negative rods noted from urine cultures, currently on Merrem. Could quickly de-escalate antibiotics based on sensitivities tomorrow. Closely monitor in ICU.     Manuela Vazquez MD

## 2019-05-21 NOTE — PROGRESS NOTES
Dr Humberto Ho At bedside, Received transfer order. Awaiting ICU room assignment. Notified patients  Johne Shown of ICU transfer.

## 2019-05-21 NOTE — FLOWSHEET NOTE
PICC line education:    -Risks  -Benefits  -Alternatives  -Procedure    Discussed the above with patient, verbalized understanding, answered all questions. Provided with information on PICC care to review. PICC tip verified via 3CG (Ok to use). Reported off to patient's  Nurse Hamzah Orr.

## 2019-05-21 NOTE — PROGRESS NOTES
Patient transferred to ICU. Bedside report given to peter CHAPMAN. Placed on tele with cyclic BP. All questions answered. ICU to call with questions.  All patient francisco transferred with her to ICU

## 2019-05-21 NOTE — PROGRESS NOTES
Patient tachypenic and tachycardic, C/O extreme nausea. Lab redraw BMP. DKA suspected. Ja GONZALEZ notified. Charge RN and clinical  notified for potential need for ICU bed. Assisted patient to Bedside commode.  PRN nausea medication given

## 2019-05-21 NOTE — PROGRESS NOTES
Patient states that she is feeling better. Is now on DKA protocol. On admission to ICU she received 1 liter Normal saline bolus and 1 amp Bicarb IVP. Last BG was 177.

## 2019-05-22 LAB
ANION GAP SERPL CALCULATED.3IONS-SCNC: 11 MMOL/L (ref 3–16)
ANION GAP SERPL CALCULATED.3IONS-SCNC: 12 MMOL/L (ref 3–16)
ANION GAP SERPL CALCULATED.3IONS-SCNC: 12 MMOL/L (ref 3–16)
ANION GAP SERPL CALCULATED.3IONS-SCNC: 7 MMOL/L (ref 3–16)
ANION GAP SERPL CALCULATED.3IONS-SCNC: 8 MMOL/L (ref 3–16)
BASOPHILS ABSOLUTE: 0.1 K/UL (ref 0–0.2)
BASOPHILS RELATIVE PERCENT: 0.3 %
BUN BLDV-MCNC: 11 MG/DL (ref 7–20)
BUN BLDV-MCNC: 3 MG/DL (ref 7–20)
BUN BLDV-MCNC: 5 MG/DL (ref 7–20)
BUN BLDV-MCNC: 7 MG/DL (ref 7–20)
BUN BLDV-MCNC: <2 MG/DL (ref 7–20)
CALCIUM SERPL-MCNC: 7.4 MG/DL (ref 8.3–10.6)
CALCIUM SERPL-MCNC: 7.8 MG/DL (ref 8.3–10.6)
CALCIUM SERPL-MCNC: 7.8 MG/DL (ref 8.3–10.6)
CALCIUM SERPL-MCNC: 7.9 MG/DL (ref 8.3–10.6)
CALCIUM SERPL-MCNC: 8 MG/DL (ref 8.3–10.6)
CHLORIDE BLD-SCNC: 106 MMOL/L (ref 99–110)
CHLORIDE BLD-SCNC: 106 MMOL/L (ref 99–110)
CHLORIDE BLD-SCNC: 109 MMOL/L (ref 99–110)
CHLORIDE BLD-SCNC: 110 MMOL/L (ref 99–110)
CHLORIDE BLD-SCNC: 110 MMOL/L (ref 99–110)
CO2: 14 MMOL/L (ref 21–32)
CO2: 15 MMOL/L (ref 21–32)
CO2: 16 MMOL/L (ref 21–32)
CO2: 16 MMOL/L (ref 21–32)
CO2: 17 MMOL/L (ref 21–32)
CREAT SERPL-MCNC: 0.5 MG/DL (ref 0.6–1.1)
CREAT SERPL-MCNC: 0.6 MG/DL (ref 0.6–1.1)
CREAT SERPL-MCNC: <0.5 MG/DL (ref 0.6–1.1)
EOSINOPHILS ABSOLUTE: 0.1 K/UL (ref 0–0.6)
EOSINOPHILS RELATIVE PERCENT: 0.4 %
GFR AFRICAN AMERICAN: >60
GFR NON-AFRICAN AMERICAN: >60
GLUCOSE BLD-MCNC: 127 MG/DL (ref 70–99)
GLUCOSE BLD-MCNC: 128 MG/DL (ref 70–99)
GLUCOSE BLD-MCNC: 132 MG/DL (ref 70–99)
GLUCOSE BLD-MCNC: 134 MG/DL (ref 70–99)
GLUCOSE BLD-MCNC: 140 MG/DL (ref 70–99)
GLUCOSE BLD-MCNC: 143 MG/DL (ref 70–99)
GLUCOSE BLD-MCNC: 153 MG/DL (ref 70–99)
GLUCOSE BLD-MCNC: 153 MG/DL (ref 70–99)
GLUCOSE BLD-MCNC: 154 MG/DL (ref 70–99)
GLUCOSE BLD-MCNC: 161 MG/DL (ref 70–99)
GLUCOSE BLD-MCNC: 163 MG/DL (ref 70–99)
GLUCOSE BLD-MCNC: 165 MG/DL (ref 70–99)
GLUCOSE BLD-MCNC: 168 MG/DL (ref 70–99)
GLUCOSE BLD-MCNC: 171 MG/DL (ref 70–99)
GLUCOSE BLD-MCNC: 172 MG/DL (ref 70–99)
GLUCOSE BLD-MCNC: 176 MG/DL (ref 70–99)
GLUCOSE BLD-MCNC: 183 MG/DL (ref 70–99)
GLUCOSE BLD-MCNC: 186 MG/DL (ref 70–99)
GLUCOSE BLD-MCNC: 221 MG/DL (ref 70–99)
HCT VFR BLD CALC: 33.5 % (ref 36–48)
HEMOGLOBIN: 11.2 G/DL (ref 12–16)
LYMPHOCYTES ABSOLUTE: 1.7 K/UL (ref 1–5.1)
LYMPHOCYTES RELATIVE PERCENT: 10.1 %
MAGNESIUM: 1.8 MG/DL (ref 1.8–2.4)
MAGNESIUM: 1.8 MG/DL (ref 1.8–2.4)
MAGNESIUM: 1.9 MG/DL (ref 1.8–2.4)
MCH RBC QN AUTO: 33.9 PG (ref 26–34)
MCHC RBC AUTO-ENTMCNC: 33.5 G/DL (ref 31–36)
MCV RBC AUTO: 101 FL (ref 80–100)
MONOCYTES ABSOLUTE: 1.2 K/UL (ref 0–1.3)
MONOCYTES RELATIVE PERCENT: 7.2 %
NEUTROPHILS ABSOLUTE: 14.1 K/UL (ref 1.7–7.7)
NEUTROPHILS RELATIVE PERCENT: 82 %
ORGANISM: ABNORMAL
PDW BLD-RTO: 13.1 % (ref 12.4–15.4)
PERFORMED ON: ABNORMAL
PHOSPHORUS: 0.7 MG/DL (ref 2.5–4.9)
PHOSPHORUS: 1 MG/DL (ref 2.5–4.9)
PHOSPHORUS: 1.2 MG/DL (ref 2.5–4.9)
PLATELET # BLD: 296 K/UL (ref 135–450)
PMV BLD AUTO: 7.2 FL (ref 5–10.5)
POTASSIUM SERPL-SCNC: 3.4 MMOL/L (ref 3.5–5.1)
POTASSIUM SERPL-SCNC: 3.8 MMOL/L (ref 3.5–5.1)
POTASSIUM SERPL-SCNC: 3.9 MMOL/L (ref 3.5–5.1)
POTASSIUM SERPL-SCNC: 4.1 MMOL/L (ref 3.5–5.1)
POTASSIUM SERPL-SCNC: 4.2 MMOL/L (ref 3.5–5.1)
RBC # BLD: 3.31 M/UL (ref 4–5.2)
SODIUM BLD-SCNC: 132 MMOL/L (ref 136–145)
SODIUM BLD-SCNC: 133 MMOL/L (ref 136–145)
SODIUM BLD-SCNC: 134 MMOL/L (ref 136–145)
SODIUM BLD-SCNC: 135 MMOL/L (ref 136–145)
SODIUM BLD-SCNC: 135 MMOL/L (ref 136–145)
URINE CULTURE, ROUTINE: ABNORMAL
URINE CULTURE, ROUTINE: ABNORMAL
WBC # BLD: 17.2 K/UL (ref 4–11)

## 2019-05-22 PROCEDURE — 6360000002 HC RX W HCPCS: Performed by: INTERNAL MEDICINE

## 2019-05-22 PROCEDURE — 6360000002 HC RX W HCPCS: Performed by: PHYSICIAN ASSISTANT

## 2019-05-22 PROCEDURE — 2000000000 HC ICU R&B

## 2019-05-22 PROCEDURE — 85025 COMPLETE CBC W/AUTO DIFF WBC: CPT

## 2019-05-22 PROCEDURE — 2580000003 HC RX 258: Performed by: PHYSICIAN ASSISTANT

## 2019-05-22 PROCEDURE — C9113 INJ PANTOPRAZOLE SODIUM, VIA: HCPCS | Performed by: INTERNAL MEDICINE

## 2019-05-22 PROCEDURE — 99233 SBSQ HOSP IP/OBS HIGH 50: CPT | Performed by: INTERNAL MEDICINE

## 2019-05-22 PROCEDURE — 80048 BASIC METABOLIC PNL TOTAL CA: CPT

## 2019-05-22 PROCEDURE — 6370000000 HC RX 637 (ALT 250 FOR IP): Performed by: INTERNAL MEDICINE

## 2019-05-22 PROCEDURE — 2580000003 HC RX 258: Performed by: INTERNAL MEDICINE

## 2019-05-22 PROCEDURE — 2580000003 HC RX 258

## 2019-05-22 PROCEDURE — 83735 ASSAY OF MAGNESIUM: CPT

## 2019-05-22 PROCEDURE — 36415 COLL VENOUS BLD VENIPUNCTURE: CPT

## 2019-05-22 PROCEDURE — 2500000003 HC RX 250 WO HCPCS: Performed by: INTERNAL MEDICINE

## 2019-05-22 PROCEDURE — 84100 ASSAY OF PHOSPHORUS: CPT

## 2019-05-22 RX ORDER — NICOTINE 21 MG/24HR
1 PATCH, TRANSDERMAL 24 HOURS TRANSDERMAL DAILY
Status: DISCONTINUED | OUTPATIENT
Start: 2019-05-22 | End: 2019-05-23 | Stop reason: HOSPADM

## 2019-05-22 RX ORDER — DEXTROSE MONOHYDRATE 50 MG/ML
100 INJECTION, SOLUTION INTRAVENOUS PRN
Status: DISCONTINUED | OUTPATIENT
Start: 2019-05-22 | End: 2019-05-23 | Stop reason: HOSPADM

## 2019-05-22 RX ORDER — SODIUM CHLORIDE 9 MG/ML
INJECTION, SOLUTION INTRAVENOUS
Status: COMPLETED
Start: 2019-05-22 | End: 2019-05-22

## 2019-05-22 RX ORDER — DEXTROSE MONOHYDRATE 25 G/50ML
12.5 INJECTION, SOLUTION INTRAVENOUS PRN
Status: DISCONTINUED | OUTPATIENT
Start: 2019-05-22 | End: 2019-05-23 | Stop reason: HOSPADM

## 2019-05-22 RX ORDER — NICOTINE POLACRILEX 4 MG
15 LOZENGE BUCCAL PRN
Status: DISCONTINUED | OUTPATIENT
Start: 2019-05-22 | End: 2019-05-23 | Stop reason: HOSPADM

## 2019-05-22 RX ADMIN — POTASSIUM CHLORIDE 10 MEQ: 7.46 INJECTION, SOLUTION INTRAVENOUS at 18:36

## 2019-05-22 RX ADMIN — DEXTROSE AND SODIUM CHLORIDE: 5; 450 INJECTION, SOLUTION INTRAVENOUS at 07:23

## 2019-05-22 RX ADMIN — SODIUM PHOSPHATE, MONOBASIC, MONOHYDRATE 20 MMOL: 276; 142 INJECTION, SOLUTION INTRAVENOUS at 21:23

## 2019-05-22 RX ADMIN — POTASSIUM CHLORIDE 10 MEQ: 7.46 INJECTION, SOLUTION INTRAVENOUS at 08:46

## 2019-05-22 RX ADMIN — Medication 10 ML: at 10:14

## 2019-05-22 RX ADMIN — SODIUM CHLORIDE 1 G: 900 INJECTION INTRAVENOUS at 10:13

## 2019-05-22 RX ADMIN — ENOXAPARIN SODIUM 40 MG: 40 INJECTION SUBCUTANEOUS at 10:13

## 2019-05-22 RX ADMIN — Medication 10 ML: at 20:05

## 2019-05-22 RX ADMIN — SODIUM CHLORIDE 1 G: 900 INJECTION INTRAVENOUS at 01:15

## 2019-05-22 RX ADMIN — POTASSIUM CHLORIDE 10 MEQ: 7.46 INJECTION, SOLUTION INTRAVENOUS at 12:43

## 2019-05-22 RX ADMIN — DEXTROSE AND SODIUM CHLORIDE: 5; 450 INJECTION, SOLUTION INTRAVENOUS at 20:04

## 2019-05-22 RX ADMIN — INSULIN GLARGINE 13 UNITS: 100 INJECTION, SOLUTION SUBCUTANEOUS at 22:45

## 2019-05-22 RX ADMIN — POTASSIUM CHLORIDE 10 MEQ: 7.46 INJECTION, SOLUTION INTRAVENOUS at 01:39

## 2019-05-22 RX ADMIN — SODIUM CHLORIDE 250 ML: 9 INJECTION, SOLUTION INTRAVENOUS at 01:13

## 2019-05-22 RX ADMIN — POTASSIUM CHLORIDE 10 MEQ: 7.46 INJECTION, SOLUTION INTRAVENOUS at 15:07

## 2019-05-22 RX ADMIN — PANTOPRAZOLE SODIUM 40 MG: 40 INJECTION, POWDER, FOR SOLUTION INTRAVENOUS at 10:14

## 2019-05-22 RX ADMIN — Medication 1 G: at 12:06

## 2019-05-22 RX ADMIN — PANTOPRAZOLE SODIUM 40 MG: 40 INJECTION, POWDER, FOR SOLUTION INTRAVENOUS at 20:05

## 2019-05-22 RX ADMIN — SODIUM PHOSPHATE, MONOBASIC, MONOHYDRATE 20 MMOL: 276; 142 INJECTION, SOLUTION INTRAVENOUS at 07:21

## 2019-05-22 RX ADMIN — DEXTROSE AND SODIUM CHLORIDE: 5; 450 INJECTION, SOLUTION INTRAVENOUS at 13:50

## 2019-05-22 RX ADMIN — POTASSIUM CHLORIDE 10 MEQ: 7.46 INJECTION, SOLUTION INTRAVENOUS at 07:20

## 2019-05-22 RX ADMIN — POTASSIUM CHLORIDE 10 MEQ: 7.46 INJECTION, SOLUTION INTRAVENOUS at 04:18

## 2019-05-22 RX ADMIN — DEXTROSE AND SODIUM CHLORIDE: 5; 450 INJECTION, SOLUTION INTRAVENOUS at 01:09

## 2019-05-22 RX ADMIN — POTASSIUM CHLORIDE 10 MEQ: 7.46 INJECTION, SOLUTION INTRAVENOUS at 03:00

## 2019-05-22 RX ADMIN — POTASSIUM CHLORIDE 10 MEQ: 7.46 INJECTION, SOLUTION INTRAVENOUS at 22:44

## 2019-05-22 RX ADMIN — POTASSIUM CHLORIDE 10 MEQ: 7.46 INJECTION, SOLUTION INTRAVENOUS at 21:22

## 2019-05-22 RX ADMIN — POTASSIUM CHLORIDE 10 MEQ: 7.46 INJECTION, SOLUTION INTRAVENOUS at 13:50

## 2019-05-22 RX ADMIN — POTASSIUM CHLORIDE 10 MEQ: 7.46 INJECTION, SOLUTION INTRAVENOUS at 20:03

## 2019-05-22 ASSESSMENT — PAIN SCALES - GENERAL
PAINLEVEL_OUTOF10: 0

## 2019-05-22 NOTE — PROGRESS NOTES
[facility] Pulmonary/CCM Progress note      Admit Date: 5/20/2019    Chief Complaint: Right flank pain    Subjective: Interval History: Still continues to have some abdominal discomfort, though feels better. Denies any significant shortness of breath, cough, chest pain and no fevers documented. Still requiring IV insulin drip, since HCO3 is still low.     Scheduled Meds:   cefTRIAXone (ROCEPHIN) IV  1 g Intravenous Q24H    nicotine  1 patch Transdermal Daily    pantoprazole  40 mg Intravenous BID    lidocaine 1 % injection  5 mL Intradermal Once    sodium chloride flush  10 mL Intravenous 2 times per day    enoxaparin  40 mg Subcutaneous Daily     Continuous Infusions:   insulin (HUMAN R) non-weight based infusion 0.72 Units/hr (05/22/19 1507)    dextrose 5 % and 0.45 % NaCl 150 mL/hr at 05/22/19 1350    dextrose       PRN Meds:promethazine, dextrose, potassium chloride, magnesium sulfate, sodium phosphate IVPB **OR** sodium phosphate IVPB **OR** sodium phosphate IVPB, dextrose 5 % and 0.45 % NaCl, sodium chloride flush, magnesium hydroxide, ondansetron, glucose, dextrose, glucagon (rDNA), dextrose    Review of Systems  Constitutional: negative for fatigue, fevers, malaise and weight loss  Ears, nose, mouth, throat: negative for ear drainage, epistaxis, hoarseness, nasal congestion, sore throat and voice change  Respiratory: negative for shortness of breath, cough, phlegm or pleurisy  Cardiovascular: negative for chest pain, chest pressure/discomfort, irregular heart beat, lower extremity edema and palpitations  Gastrointestinal: negative for abdominal pain, constipation, diarrhea, jaundice, melena, odynophagia, reflux symptoms and vomiting  Hematologic/lymphatic: negative for bleeding, easy bruising, lymphadenopathy and petechiae  Musculoskeletal:negative for arthralgias, bone pain, muscle weakness, neck pain and stiff joints  Neurological: negative for dizziness, gait problems, headaches, seizures, speech problems, tremors and weakness  Behavioral/Psych: negative for anxiety, behavior problems, depression, fatigue and sleep disturbance  Endocrine: negative for diabetic symptoms including none, neuropathy, polyphagia, polyuria, polydipsia, vomiting and diarrhea and temperature intolerance  Allergic/Immunologic: negative for anaphylaxis, angioedema, hay fever and urticaria    Objective:     Patient Vitals for the past 8 hrs:   BP Pulse Resp SpO2   05/22/19 1500 112/74 80 20 100 %   05/22/19 1400 -- 80 17 100 %   05/22/19 1300 109/71 76 15 99 %   05/22/19 1100 105/64 81 24 100 %   05/22/19 0900 (!) 129/90 74 18 100 %     I/O last 3 completed shifts: In: 6120.6 [I.V.:5180.6; IV Piggyback:940]  Out: 2400 [Urine:2400]  No intake/output data recorded.     General Appearance: alert and oriented to person, place and time, well developed and well- nourished, in no acute distress  Skin: warm and dry, no rash or erythema  Head: normocephalic and atraumatic  Eyes: pupils equal, round, and reactive to light, extraocular eye movements intact, conjunctivae normal  ENT: external ear and ear canal normal bilaterally, nose without deformity, nasal mucosa and turbinates normal  Neck: supple and non-tender without mass, no cervical lymphadenopathy  Pulmonary/Chest: clear to auscultation bilaterally- no wheezes, rales or rhonchi, normal air movement, no respiratory distress  Cardiovascular: normal rate, regular rhythm,  no murmurs, rubs, distal pulses intact, no carotid bruits  Abdomen: soft, non-tender, non-distended, normal bowel sounds, no masses or organomegaly  Lymph Nodes: Cervical, supraclavicular normal  Extremities: no cyanosis, clubbing or edema  Musculoskeletal: normal range of motion, no joint swelling, deformity or tenderness  Neurologic: alert, no focal neurologic deficits    Data Review:  CBC:   Lab Results   Component Value Date    WBC 17.2 05/22/2019    RBC 3.31 05/22/2019     BMP:   Lab Results   Component Value Date GLUCOSE 154 05/22/2019    CO2 16 05/22/2019    BUN 5 05/22/2019    CREATININE <0.5 05/22/2019    CALCIUM 7.4 05/22/2019     ABG:   Lab Results   Component Value Date    UPQ5OLZ 1.6 05/21/2019    BEART -31.2 05/21/2019    C3IRITJV 98.3 05/21/2019    PHART 6.833 05/21/2019    KFJ5SJQ see below 05/21/2019    PO2ART 164.0 05/21/2019    YCC2OMX 4.2 05/21/2019       Radiology: All pertinent images / reports were reviewed as a part of this visit. Narrative   EXAMINATION:   CT OF THE ABDOMEN AND PELVIS WITHOUT CONTRAST 5/20/2019 1:46 am       TECHNIQUE:   CT of the abdomen and pelvis was performed without the administration of   intravenous contrast. Multiplanar reformatted images are provided for review. Dose modulation, iterative reconstruction, and/or weight based adjustment of   the mA/kV was utilized to reduce the radiation dose to as low as reasonably   achievable.       COMPARISON:   None.       HISTORY:   ORDERING SYSTEM PROVIDED HISTORY: Right flank and right lower quadrant   abdominal pain   TECHNOLOGIST PROVIDED HISTORY:   Ordering Physician Provided Reason for Exam: Flank Pain (Patient presents to   ER with complaints of right flank pain with nausea/vomiting. )   Acuity: Acute   Type of Exam: Initial       FINDINGS:   Lower Chest: There is middle lobe scarring.       Organs: The kidneys and ureters are unremarkable. In particular there is no   stone or hydronephrosis.       The liver, spleen, pancreas, gallbladder and adrenal glands are grossly   negative given the limitations of the noncontrast technique.       GI/Bowel: Small bowel caliber is normal.  The appendix is not identified. There are no inflammatory changes to suggest appendicitis.  The colon is   unremarkable.       Pelvis:  The uterus and bladder are grossly negative.       Peritoneum/Retroperitoneum: No adenopathy, mesenteric stranding or free   fluid.  Aortic caliber is normal.       Bones/Soft Tissues: Unremarkable.           Impression

## 2019-05-22 NOTE — PROGRESS NOTES
[facility] PROGRESS NOTE    5/22/2019 2:49 PM        Name: Yakelin Gonzalez . Admitted: 5/20/2019  Primary Care Provider: No primary care provider on file. (Tel: None)    Brief Course:  35 yo F with DM 1 on insulin, tobacco abuse came to ER with R flank pain. Admitted as inpatient for acute pyelonephritis. Started on IVF and IV Rocephin. Developed DKA and transferred to ICU on 5/21. CC:  R flank pain    Subjective:  . Patient remains on insulin gtt as AG not closed. No nausea, vomiting or abdominal pain. No pain in R flank pain. No diarrhea or CP.   No SOB    Reviewed interval ancillary notes    Current Medications    cefTRIAXone (ROCEPHIN) 1 g in sterile water 10 mL IV syringe Q24H   nicotine (NICODERM CQ) 21 MG/24HR 1 patch Daily   promethazine (PHENERGAN) injection 12.5 mg Q6H PRN   pantoprazole (PROTONIX) injection 40 mg BID   dextrose 50 % solution 12.5 g PRN   potassium chloride 10 mEq/100 mL IVPB (Peripheral Line) PRN   magnesium sulfate 1 g in dextrose 5% 100 mL IVPB PRN   sodium phosphate 10 mmol in dextrose 5 % 250 mL IVPB PRN   Or    sodium phosphate 15 mmol in dextrose 5 % 250 mL IVPB PRN   Or    sodium phosphate 20 mmol in dextrose 5 % 500 mL IVPB PRN   insulin regular (HUMULIN R;NOVOLIN R) 100 Units in sodium chloride 0.9 % 100 mL infusion Continuous   dextrose 5 % and 0.45 % sodium chloride infusion Continuous PRN   lidocaine PF 1 % injection 5 mL Once   sodium chloride flush 0.9 % injection 10 mL 2 times per day   sodium chloride flush 0.9 % injection 10 mL PRN   magnesium hydroxide (MILK OF MAGNESIA) 400 MG/5ML suspension 30 mL Daily PRN   ondansetron (ZOFRAN) injection 4 mg Q6H PRN   enoxaparin (LOVENOX) injection 40 mg Daily   glucose (GLUTOSE) 40 % oral gel 15 g PRN   dextrose 50 % solution 12.5 g PRN   glucagon (rDNA) injection 1 mg PRN   dextrose 5 % solution PRN       Objective:  BP 105/64   Pulse 81   Temp 98.3 °F (36.8 °C) (Temporal)   Resp 24   Ht 5' 1\" (1.549 m)   Wt 113 lb 6.4 oz (51.4 kg)   LMP 05/08/2019   SpO2 100%   BMI 21.43 kg/m²     Intake/Output Summary (Last 24 hours) at 5/22/2019 1449  Last data filed at 5/22/2019 1248  Gross per 24 hour   Intake 4067.64 ml   Output 2400 ml   Net 1667.64 ml      Wt Readings from Last 3 Encounters:   05/22/19 113 lb 6.4 oz (51.4 kg)       General appearance:  Appears comfortable, awake, NAD, sitting up in bed  Eyes: Sclera clear. Pupils equal.  ENT: Moist oral mucosa. Trachea midline, no adenopathy. Cardiovascular: RRR. No murmur. No edema in lower extremities  Respiratory: Not using accessory muscles. Good inspiratory effort. Clear to auscultation bilaterally, no wheeze or crackles. GI: Abdomen soft, no tenderness, not distended, normal bowel sounds  Musculoskeletal: No R CVA tenderness  Neurology: Grossly intact. No speech or motor deficits  Psych: Normal affect. Alert and oriented in time, place and person  Skin: Warm, dry, normal turgor  Extremity exam shows brisk capillary refill. Peripheral pulses are palpable in lower extremities     Labs and Tests:  CBC:   Recent Labs     05/20/19  0120 05/21/19  0631 05/22/19  1210   WBC 24.0* 31.3* 17.2*   HGB 14.0 13.7 11.2*    374 296     BMP:    Recent Labs     05/22/19  0100 05/22/19  0515 05/22/19  1210   * 135* 133*   K 3.9 4.2 3.4*    109 110   CO2 14* 15* 16*   BUN 11 7 5*   CREATININE 0.6 0.5* <0.5*   GLUCOSE 186* 134* 154*     Hepatic:   Recent Labs     05/20/19  0120   AST 22   ALT 15   BILITOT 1.3*   ALKPHOS 147*     CT ABDOMEN PELVIS WO CONTRAST   Final Result   Negative noncontrast CT scan. No evidence for stone disease at this time.                Problem List  Principal Problem:    Pyelonephritis  Active Problems:    DKA, type 1 (HCC)    Hyperkalemia    Intractable nausea and vomiting    Leukocytosis    Sepsis ([address]  Resolved Problems:    * No resolved hospital problems. *       Assessment & Plan:   1. NPO until DKA resolved  2. Continue ICU for DKA  3. Insulin gtt via DKA protocol  4. IVF per DKA protocol  5. Change IV Merrem to IV Rocephin given UCx with E Coli that is pansensitive  6. F/U Blood cx  7. Will start Lantus 13 U daily and Humalog 6 U with meals with SSI once DKA resolved  8. [facility] consult for critical care appreciated  9. Finish IV Toradol X 8 doses for pyelonephritis  10. Follow and replete lytes closely    IV Access: Peripheral  Larson: No  Diet: Diet NPO Effective Now  Code:Full Code  DVT PPX Lovenox  Disposition Home    Discussed with patient, nursing and CM. Discussed with . Profoundly better. Hopefully home tomorrow. Will need to fill Rx here as she has no insurance.       Kaylynn Ramey MD   5/22/2019 2:49 PM

## 2019-05-22 NOTE — PROGRESS NOTES
Shift assessment complete, see flowsheets. Pt afebrile, NSR on monitor, room air. Pt denies pain at this time. Pt updated on plan of care, denies questions or concerns at this time. IV fluids and electrolyte replacement per orders/protocol. Call light within reach for safety. Will continue to monitor.

## 2019-05-22 NOTE — CARE COORDINATION
Discharge planning-    Patient noted to be a self-pay. Patient has been assessed by the Financial Counselor, and is eligible for financial assistance +meds to beds. Will attempt to speak with the patient, as schedule allows. Plan- Eligible for financial assistance + se[facility]ity meds.     Will continue to follow for support and discharge planning.    -Nuzhat Butler, SALMA, SAMANTHAW

## 2019-05-22 NOTE — PROGRESS NOTES
Patient remains on DKA protocol, CO2 still low. She is alert and oriented x 4. Denies discomfort. Electrolyte replacement per protocol.

## 2019-05-22 NOTE — PROGRESS NOTES
Reassessment complete, see flowsheets. Pt afebrile, NSR on monitor, room air. Pt continues to deny pain. DKA protocol continues, electrolyte replacement per protocol, see MAR. Will continue to monitor.

## 2019-05-22 NOTE — PLAN OF CARE
Problem: Pain:  Goal: Pain level will decrease  Description  Pain level will decrease  Outcome: Ongoing  Goal: Control of acute pain  Description  Control of acute pain  Outcome: Ongoing     Problem: Falls - Risk of:  Goal: Will remain free from falls  Description  Will remain free from falls  Outcome: Ongoing     Problem: Serum Glucose Level - Abnormal:  Goal: Ability to maintain appropriate glucose levels will improve  Description  Ability to maintain appropriate glucose levels will improve  Outcome: Ongoing     Problem: Sensory Perception - Impaired:  Goal: Ability to maintain a stable neurologic state will improve  Description  Ability to maintain a stable neurologic state will improve  Outcome: Ongoing     Problem: Sensory:  Goal: General experience of comfort will improve  Description  General experience of comfort will improve  Outcome: Ongoing     Problem: Urinary Elimination:  Goal: Signs and symptoms of infection will decrease  Description  Signs and symptoms of infection will decrease  Outcome: Ongoing

## 2019-05-23 VITALS
TEMPERATURE: 97.2 F | OXYGEN SATURATION: 98 % | HEIGHT: 61 IN | SYSTOLIC BLOOD PRESSURE: 126 MMHG | RESPIRATION RATE: 21 BRPM | DIASTOLIC BLOOD PRESSURE: 77 MMHG | HEART RATE: 83 BPM | WEIGHT: 115.6 LBS | BODY MASS INDEX: 21.83 KG/M2

## 2019-05-23 LAB
ANION GAP SERPL CALCULATED.3IONS-SCNC: 8 MMOL/L (ref 3–16)
BUN BLDV-MCNC: <2 MG/DL (ref 7–20)
CALCIUM SERPL-MCNC: 8.1 MG/DL (ref 8.3–10.6)
CHLORIDE BLD-SCNC: 110 MMOL/L (ref 99–110)
CO2: 19 MMOL/L (ref 21–32)
CREAT SERPL-MCNC: <0.5 MG/DL (ref 0.6–1.1)
GFR AFRICAN AMERICAN: >60
GFR NON-AFRICAN AMERICAN: >60
GLUCOSE BLD-MCNC: 158 MG/DL (ref 70–99)
GLUCOSE BLD-MCNC: 162 MG/DL (ref 70–99)
GLUCOSE BLD-MCNC: 249 MG/DL (ref 70–99)
GLUCOSE BLD-MCNC: 89 MG/DL (ref 70–99)
MAGNESIUM: 2 MG/DL (ref 1.8–2.4)
PERFORMED ON: ABNORMAL
PERFORMED ON: ABNORMAL
PERFORMED ON: NORMAL
PHOSPHORUS: 2.3 MG/DL (ref 2.5–4.9)
POTASSIUM SERPL-SCNC: 3.7 MMOL/L (ref 3.5–5.1)
SODIUM BLD-SCNC: 137 MMOL/L (ref 136–145)

## 2019-05-23 PROCEDURE — C9113 INJ PANTOPRAZOLE SODIUM, VIA: HCPCS | Performed by: INTERNAL MEDICINE

## 2019-05-23 PROCEDURE — 80048 BASIC METABOLIC PNL TOTAL CA: CPT

## 2019-05-23 PROCEDURE — 83735 ASSAY OF MAGNESIUM: CPT

## 2019-05-23 PROCEDURE — 2580000003 HC RX 258: Performed by: INTERNAL MEDICINE

## 2019-05-23 PROCEDURE — 6370000000 HC RX 637 (ALT 250 FOR IP): Performed by: INTERNAL MEDICINE

## 2019-05-23 PROCEDURE — 6360000002 HC RX W HCPCS: Performed by: INTERNAL MEDICINE

## 2019-05-23 PROCEDURE — 84100 ASSAY OF PHOSPHORUS: CPT

## 2019-05-23 RX ORDER — CEPHALEXIN 500 MG/1
500 CAPSULE ORAL 3 TIMES DAILY
Qty: 36 CAPSULE | Refills: 0 | Status: SHIPPED | OUTPATIENT
Start: 2019-05-23 | End: 2019-06-04

## 2019-05-23 RX ORDER — NITROFURANTOIN 25; 75 MG/1; MG/1
100 CAPSULE ORAL EVERY 12 HOURS SCHEDULED
Status: DISCONTINUED | OUTPATIENT
Start: 2019-05-23 | End: 2019-05-23

## 2019-05-23 RX ORDER — NICOTINE 21 MG/24HR
1 PATCH, TRANSDERMAL 24 HOURS TRANSDERMAL DAILY
Qty: 30 PATCH | Refills: 0 | Status: SHIPPED | OUTPATIENT
Start: 2019-05-24 | End: 2020-11-29

## 2019-05-23 RX ORDER — CEPHALEXIN 500 MG/1
500 CAPSULE ORAL 4 TIMES DAILY
Qty: 20 CAPSULE | Refills: 0 | Status: SHIPPED | OUTPATIENT
Start: 2019-05-23 | End: 2019-05-23 | Stop reason: SDUPTHER

## 2019-05-23 RX ADMIN — ENOXAPARIN SODIUM 40 MG: 40 INJECTION SUBCUTANEOUS at 09:21

## 2019-05-23 RX ADMIN — PANTOPRAZOLE SODIUM 40 MG: 40 INJECTION, POWDER, FOR SOLUTION INTRAVENOUS at 09:21

## 2019-05-23 RX ADMIN — Medication 10 ML: at 09:21

## 2019-05-23 RX ADMIN — POTASSIUM PHOSPHATE, MONOBASIC 500 MG: 500 TABLET, SOLUBLE ORAL at 11:02

## 2019-05-23 ASSESSMENT — PAIN SCALES - GENERAL
PAINLEVEL_OUTOF10: 0

## 2019-05-23 NOTE — PLAN OF CARE
Problem: Pain:  Goal: Pain level will decrease  Description  Pain level will decrease  5/23/2019 1041 by Anne Degroot RN  Outcome: Completed. Pt denies pain. Problem: Falls - Risk of:  Goal: Will remain free from falls  Description  Will remain free from falls  5/23/2019 1041 by Anne Degroot RN  Outcome: Completed. No falls/injuries this shift. Problem: Serum Glucose Level - Abnormal:  Goal: Ability to maintain appropriate glucose levels will improve  Description  Ability to maintain appropriate glucose levels will improve  5/23/2019 1041 by Anne Degroot RN  Outcome: Completed. Type 1 DM. AM BS 89. Problem: Sensory:  Goal: General experience of comfort will improve  Description  General experience of comfort will improve  5/23/2019 1041 by Anne Degroot RN  Outcome: Completed. Pt stated goal met. Problem: Urinary Elimination:  Goal: Signs and symptoms of infection will decrease  Description  Signs and symptoms of infection will decrease  5/23/2019 1041 by Anne Degroot RN  Outcome: Completed. Abx x 12 days upon discharge. Pt to be discharged today.  LUÍS ThompsonN, RN

## 2019-05-23 NOTE — PROGRESS NOTES
Message sent to hospitalist at this time \"Pt on DKA protocol. Co2 has been > 15 for past 4 hours & gap 12 for past 4 hours. Do you want to stop drip/protocol? \"    Per night hospitalist Dr. Archie Smith ok to stop drip. Lantus 13 units given at this time. Will keep insulin drip infusing for 2 more hours and then turn off. Pt updated on plan of care. Pt eating turkey sandwich at this time.

## 2019-05-23 NOTE — PROGRESS NOTES
Outpatient pharmacy delivered meds at this time. Pt  at bedside. Pt in wheelchair- wheeled down to POV. Pt discharged at this time.  LUÍS DacostaN, RN

## 2019-05-23 NOTE — PROGRESS NOTES
Reassessment complete, see flowsheets. DKA protocol was stopped about 0050. BS checked at this time 158. Pt resting. Will continue to monitor.

## 2019-05-23 NOTE — DISCHARGE SUMMARY
Hospital Medicine Discharge Summary    Patient: Kodi Dean     Gender: female  : 1980   Age: 34 y.o. MRN: [id number]    Admitting Physician: Tony Galarza MD  Discharge Physician: Haroldo Ramirez MD     Code Status: Full Code     Admit Date: 2019   Discharge Date:   19    Disposition:  Home    Discharge Diagnoses: Active Hospital Problems    Diagnosis Date Noted    DKA, type 1 ([address] [E10.10] 2019    Hyperkalemia [E87.5] 2019    Intractable nausea and vomiting [R11.2] 2019    Leukocytosis [D72.829] 2019    Sepsis ([address] [A41.9] 2019    Pyelonephritis [N12] 2019       Follow-up appointments:  one week    Outpatient to do list: Find new PCP ASAP    Condition at Discharge:  Stable    Hospital Course:   35 yo F with DM 1 on insulin, tobacco abuse came to ER with R flank pain. Admitted as inpatient for acute pyelonephritis. Started on IVF and IV Rocephin. Developed DKA and transferred to ICU on . Abx changed to [facility]. Treated with insulin gtt and IVF. Had quick improvement overall. UCx with pansensitive E Coli. Tolerating diet after DKA resolved. Written for Lantus and Humalog. Will establish with PCP. Continue Keflex for 12 days for pyelonephritis to complete 2 week course. Counseled to stop smoking. Written for nicotine patches.         Discharge Medications:   Current Discharge Medication List      START taking these medications    Details   nicotine (NICODERM CQ) 21 MG/24HR Place 1 patch onto the skin daily  Qty: 30 patch, Refills: 0      insulin glargine (LANTUS SOLOSTAR) 100 UNIT/ML injection pen Inject 13 Units into the skin nightly  Qty: 5 pen, Refills: 0      insulin lispro (HUMALOG KWIKPEN) 100 UNIT/ML pen Inject 5 Units into the skin 3 times daily (before meals)  Qty: 5 pen, Refills: 0      cephALEXin (KEFLEX) 500 MG capsule Take 1 capsule by mouth 3 times daily for 12 days  Qty: 36 capsule, Refills: 0           Current Discharge Medication List        Current Discharge Medication List        Current Discharge Medication List      STOP taking these medications       Insulin Detemir (LEVEMIR SC) Comments:   Reason for Stopping:               Discharge Exam:    /77   Pulse 83   Temp 97.2 °F (36.2 °C) (Temporal)   Resp 21   Ht 5' 1\" (1.549 m)   Wt 115 lb 9.6 oz (52.4 kg)   LMP 05/08/2019   SpO2 98%   BMI 21.84 kg/m²     General appearance:  Appears comfortable, awake, NAD, sitting up in bed  Eyes: Sclera clear. Pupils equal.  ENT: Moist oral mucosa. Trachea midline, no adenopathy. Cardiovascular: RRR. No murmur. No edema in lower extremities  Respiratory: Not using accessory muscles. Good inspiratory effort. Clear to auscultation bilaterally, no wheeze or crackles. GI: Abdomen soft, no tenderness, not distended, normal bowel sounds  Musculoskeletal: No R CVA tenderness  Neurology: Grossly intact. No speech or motor deficits  Psych: Normal affect. Alert and oriented in time, place and person  Skin: Warm, dry, normal turgor  Extremity exam shows brisk capillary refill. Peripheral pulses are palpable in lower extremities         Labs:  For convenience and continuity at follow-up the following most recent labs are provided:    Lab Results   Component Value Date    WBC 17.2 05/22/2019    HGB 11.2 05/22/2019    HCT 33.5 05/22/2019    .0 05/22/2019     05/22/2019     05/23/2019    K 3.7 05/23/2019    K 6.3 05/21/2019    K 6.3 05/21/2019     05/23/2019    CO2 19 05/23/2019    BUN <2 05/23/2019    CREATININE <0.5 05/23/2019    CALCIUM 8.1 05/23/2019    PHOS 2.3 05/23/2019    ALKPHOS 147 05/20/2019    ALT 15 05/20/2019    AST 22 05/20/2019    BILITOT 1.3 05/20/2019    LABALBU 4.3 05/20/2019     No results found for: INR    Radiology:  Ct Abdomen Pelvis Wo Contrast    Result Date: 5/20/2019  EXAMINATION: CT OF THE ABDOMEN AND PELVIS WITHOUT CONTRAST 5/20/2019 1:46 am TECHNIQUE: CT of the abdomen and pelvis was performed without the administration of intravenous contrast. Multiplanar reformatted images are provided for review. Dose modulation, iterative reconstruction, and/or weight based adjustment of the mA/kV was utilized to reduce the radiation dose to as low as reasonably achievable. COMPARISON: None. HISTORY: ORDERING SYSTEM PROVIDED HISTORY: Right flank and right lower quadrant abdominal pain TECHNOLOGIST PROVIDED HISTORY: Ordering Physician Provided Reason for Exam: Flank Pain (Patient presents to ER with complaints of right flank pain with nausea/vomiting. ) Acuity: Acute Type of Exam: Initial FINDINGS: Lower Chest: There is middle lobe scarring. Organs: The kidneys and ureters are unremarkable. In particular there is no stone or hydronephrosis. The liver, spleen, pancreas, gallbladder and adrenal glands are grossly negative given the limitations of the noncontrast technique. GI/Bowel: Small bowel caliber is normal.  The appendix is not identified. There are no inflammatory changes to suggest appendicitis. The colon is unremarkable. Pelvis: The uterus and bladder are grossly negative. Peritoneum/Retroperitoneum: No adenopathy, mesenteric stranding or free fluid. Aortic caliber is normal. Bones/Soft Tissues: Unremarkable. Negative noncontrast CT scan. No evidence for stone disease at this time. The patient was seen and examined on day of discharge and this discharge summary is in conjunction with any daily progress note from day of discharge. Time Spent on discharge is 45 minutes  in the examination, evaluation, counseling and review of medications and discharge plan. Note that more than 30 minutes was spent in preparing discharge papers, discussing discharge with patient, medication review, etc.       Signed:    Nora Gant MD   5/23/2019      Thank you No primary care provider on file. for the opportunity to be involved in this patient's care.  If you have any questions or concerns please feel free to contact me at [facility].

## 2019-05-23 NOTE — PROGRESS NOTES
CLINICAL PHARMACY NOTE: MEDS TO [address] Select Patient?: No  Total # of Prescriptions Filled: 5   The following medications were delivered to the patient:  · Leader pen needle  · Cephalexin  · basaglar  · Nicotine 21mg  · humalog  Total # of Interventions Completed: 0  Time Spent (min): 60    Additional Documentation:  Delivered meds to patient bedside.  Patient signed for meds

## 2019-05-23 NOTE — PROGRESS NOTES
Shift assessment completed, see doc flowsheet for details. VSS. NSR. A&OX4. Denies pain. Lungs: Clear throughout. RA. GI: Abd soft, nontender. Bowel sounds active x4. : Voids to toilet independently. Skin: Warm, dry, intact. Ambulates independently with steady gait. BS-89 no coverage needed, breakfast at bedside. Meds given, see MAR.  Anne Degroot, LUÍSN, RN

## 2019-05-23 NOTE — PROGRESS NOTES
Discharge teaching completed. Pt will f/u with new PCP within one week in [facility] from list provided by Serene Miranda at bedside. Pt and  stated all questions answered. Waiting on outpatient pharmacy to fill/bring prescriptions- then pt is able to be discharged. All personal belongings with patient. Pt changed into personal clothes. Wheelchair at bedside.  Vinton Spatz, LUÍSN, RN

## 2019-05-23 NOTE — DISCHARGE INSTR - DIET

## 2019-05-26 LAB
BLOOD CULTURE, ROUTINE: NORMAL
CULTURE, BLOOD 2: NORMAL

## 2020-11-29 ENCOUNTER — APPOINTMENT (OUTPATIENT)
Dept: GENERAL RADIOLOGY | Age: 40
End: 2020-11-29

## 2020-11-29 ENCOUNTER — APPOINTMENT (OUTPATIENT)
Dept: CT IMAGING | Age: 40
End: 2020-11-29

## 2020-11-29 ENCOUNTER — HOSPITAL ENCOUNTER (EMERGENCY)
Age: 40
Discharge: LEFT BEFORE DISCHARGE | End: 2020-11-29
Attending: EMERGENCY MEDICINE

## 2020-11-29 VITALS
RESPIRATION RATE: 16 BRPM | SYSTOLIC BLOOD PRESSURE: 136 MMHG | TEMPERATURE: 98.8 F | DIASTOLIC BLOOD PRESSURE: 86 MMHG | OXYGEN SATURATION: 100 % | WEIGHT: 110.45 LBS | HEART RATE: 105 BPM | BODY MASS INDEX: 20.87 KG/M2

## 2020-11-29 LAB
A/G RATIO: 1.7 (ref 1.1–2.2)
ALBUMIN SERPL-MCNC: 4.5 G/DL (ref 3.4–5)
ALP BLD-CCNC: 84 U/L (ref 40–129)
ALT SERPL-CCNC: 17 U/L (ref 10–40)
ANION GAP SERPL CALCULATED.3IONS-SCNC: 19 MMOL/L (ref 3–16)
ANION GAP SERPL CALCULATED.3IONS-SCNC: 22 MMOL/L (ref 3–16)
AST SERPL-CCNC: 37 U/L (ref 15–37)
BASE EXCESS VENOUS: -4.9 MMOL/L
BASOPHILS ABSOLUTE: 0.3 K/UL (ref 0–0.2)
BASOPHILS RELATIVE PERCENT: 1.2 %
BETA-HYDROXYBUTYRATE: 3.74 MMOL/L (ref 0–0.27)
BILIRUB SERPL-MCNC: 0.7 MG/DL (ref 0–1)
BILIRUBIN URINE: NEGATIVE
BLOOD, URINE: NEGATIVE
BUN BLDV-MCNC: 14 MG/DL (ref 7–20)
BUN BLDV-MCNC: 18 MG/DL (ref 7–20)
CALCIUM SERPL-MCNC: 7.8 MG/DL (ref 8.3–10.6)
CALCIUM SERPL-MCNC: 8.8 MG/DL (ref 8.3–10.6)
CARBOXYHEMOGLOBIN: 2.1 %
CHLORIDE BLD-SCNC: 101 MMOL/L (ref 99–110)
CHLORIDE BLD-SCNC: 99 MMOL/L (ref 99–110)
CLARITY: CLEAR
CO2: 12 MMOL/L (ref 21–32)
CO2: 18 MMOL/L (ref 21–32)
COLOR: YELLOW
CREAT SERPL-MCNC: 0.6 MG/DL (ref 0.6–1.1)
CREAT SERPL-MCNC: <0.5 MG/DL (ref 0.6–1.1)
EOSINOPHILS ABSOLUTE: 0 K/UL (ref 0–0.6)
EOSINOPHILS RELATIVE PERCENT: 0 %
GFR AFRICAN AMERICAN: >60
GFR AFRICAN AMERICAN: >60
GFR NON-AFRICAN AMERICAN: >60
GFR NON-AFRICAN AMERICAN: >60
GLOBULIN: 2.7 G/DL
GLUCOSE BLD-MCNC: 190 MG/DL (ref 70–99)
GLUCOSE BLD-MCNC: 239 MG/DL
GLUCOSE BLD-MCNC: 239 MG/DL (ref 70–99)
GLUCOSE BLD-MCNC: 243 MG/DL (ref 70–99)
GLUCOSE BLD-MCNC: 257 MG/DL (ref 70–99)
GLUCOSE BLD-MCNC: 268 MG/DL (ref 70–99)
GLUCOSE URINE: >=1000 MG/DL
HCG QUALITATIVE: NEGATIVE
HCO3 VENOUS: 21 MMOL/L (ref 23–29)
HCT VFR BLD CALC: 41.9 % (ref 36–48)
HEMOGLOBIN: 14 G/DL (ref 12–16)
KETONES, URINE: >=80 MG/DL
LEUKOCYTE ESTERASE, URINE: NEGATIVE
LYMPHOCYTES ABSOLUTE: 1.4 K/UL (ref 1–5.1)
LYMPHOCYTES RELATIVE PERCENT: 5.9 %
MCH RBC QN AUTO: 32.8 PG (ref 26–34)
MCHC RBC AUTO-ENTMCNC: 33.4 G/DL (ref 31–36)
MCV RBC AUTO: 98.2 FL (ref 80–100)
METHEMOGLOBIN VENOUS: 0.6 %
MICROSCOPIC EXAMINATION: ABNORMAL
MONOCYTES ABSOLUTE: 0.7 K/UL (ref 0–1.3)
MONOCYTES RELATIVE PERCENT: 3 %
NEUTROPHILS ABSOLUTE: 21.1 K/UL (ref 1.7–7.7)
NEUTROPHILS RELATIVE PERCENT: 89.9 %
NITRITE, URINE: NEGATIVE
O2 CONTENT, VEN: 10 ML/DL
O2 SAT, VEN: 52 %
O2 THERAPY: ABNORMAL
PCO2, VEN: 39 MMHG (ref 40–50)
PDW BLD-RTO: 14.2 % (ref 12.4–15.4)
PERFORMED ON: ABNORMAL
PH UA: 6 (ref 5–8)
PH VENOUS: 7.33 (ref 7.35–7.45)
PLATELET # BLD: 423 K/UL (ref 135–450)
PMV BLD AUTO: 7.2 FL (ref 5–10.5)
PO2, VEN: 30 MMHG
POTASSIUM REFLEX MAGNESIUM: 4.2 MMOL/L (ref 3.5–5.1)
POTASSIUM REFLEX MAGNESIUM: 4.2 MMOL/L (ref 3.5–5.1)
PROTEIN UA: NEGATIVE MG/DL
RBC # BLD: 4.27 M/UL (ref 4–5.2)
SODIUM BLD-SCNC: 133 MMOL/L (ref 136–145)
SODIUM BLD-SCNC: 138 MMOL/L (ref 136–145)
SPECIFIC GRAVITY UA: 1.02 (ref 1–1.03)
TCO2 CALC VENOUS: 22 MMOL/L
TOTAL PROTEIN: 7.2 G/DL (ref 6.4–8.2)
URINE REFLEX TO CULTURE: ABNORMAL
URINE TYPE: ABNORMAL
UROBILINOGEN, URINE: 0.2 E.U./DL
WBC # BLD: 23.5 K/UL (ref 4–11)

## 2020-11-29 PROCEDURE — 99285 EMERGENCY DEPT VISIT HI MDM: CPT

## 2020-11-29 PROCEDURE — 84703 CHORIONIC GONADOTROPIN ASSAY: CPT

## 2020-11-29 PROCEDURE — 96374 THER/PROPH/DIAG INJ IV PUSH: CPT

## 2020-11-29 PROCEDURE — 71046 X-RAY EXAM CHEST 2 VIEWS: CPT

## 2020-11-29 PROCEDURE — 6370000000 HC RX 637 (ALT 250 FOR IP): Performed by: PHYSICIAN ASSISTANT

## 2020-11-29 PROCEDURE — 6360000004 HC RX CONTRAST MEDICATION: Performed by: PHYSICIAN ASSISTANT

## 2020-11-29 PROCEDURE — 74177 CT ABD & PELVIS W/CONTRAST: CPT

## 2020-11-29 PROCEDURE — 82010 KETONE BODYS QUAN: CPT

## 2020-11-29 PROCEDURE — 96375 TX/PRO/DX INJ NEW DRUG ADDON: CPT

## 2020-11-29 PROCEDURE — 2580000003 HC RX 258: Performed by: PHYSICIAN ASSISTANT

## 2020-11-29 PROCEDURE — 80053 COMPREHEN METABOLIC PANEL: CPT

## 2020-11-29 PROCEDURE — 81003 URINALYSIS AUTO W/O SCOPE: CPT

## 2020-11-29 PROCEDURE — 6360000002 HC RX W HCPCS: Performed by: PHYSICIAN ASSISTANT

## 2020-11-29 PROCEDURE — 85025 COMPLETE CBC W/AUTO DIFF WBC: CPT

## 2020-11-29 PROCEDURE — 82803 BLOOD GASES ANY COMBINATION: CPT

## 2020-11-29 RX ORDER — 0.9 % SODIUM CHLORIDE 0.9 %
1000 INTRAVENOUS SOLUTION INTRAVENOUS ONCE
Status: COMPLETED | OUTPATIENT
Start: 2020-11-29 | End: 2020-11-29

## 2020-11-29 RX ORDER — ONDANSETRON 2 MG/ML
4 INJECTION INTRAMUSCULAR; INTRAVENOUS ONCE
Status: COMPLETED | OUTPATIENT
Start: 2020-11-29 | End: 2020-11-29

## 2020-11-29 RX ORDER — METOCLOPRAMIDE HYDROCHLORIDE 5 MG/ML
5 INJECTION INTRAMUSCULAR; INTRAVENOUS ONCE
Status: COMPLETED | OUTPATIENT
Start: 2020-11-29 | End: 2020-11-29

## 2020-11-29 RX ORDER — DIPHENHYDRAMINE HYDROCHLORIDE 50 MG/ML
12.5 INJECTION INTRAMUSCULAR; INTRAVENOUS ONCE
Status: COMPLETED | OUTPATIENT
Start: 2020-11-29 | End: 2020-11-29

## 2020-11-29 RX ORDER — KETOROLAC TROMETHAMINE 30 MG/ML
15 INJECTION, SOLUTION INTRAMUSCULAR; INTRAVENOUS ONCE
Status: COMPLETED | OUTPATIENT
Start: 2020-11-29 | End: 2020-11-29

## 2020-11-29 RX ADMIN — ONDANSETRON 4 MG: 2 INJECTION INTRAMUSCULAR; INTRAVENOUS at 18:44

## 2020-11-29 RX ADMIN — SODIUM CHLORIDE 1000 ML: 9 INJECTION, SOLUTION INTRAVENOUS at 18:31

## 2020-11-29 RX ADMIN — DIPHENHYDRAMINE HYDROCHLORIDE 12.5 MG: 50 INJECTION, SOLUTION INTRAMUSCULAR; INTRAVENOUS at 21:28

## 2020-11-29 RX ADMIN — METOCLOPRAMIDE HYDROCHLORIDE 5 MG: 5 INJECTION INTRAMUSCULAR; INTRAVENOUS at 21:27

## 2020-11-29 RX ADMIN — KETOROLAC TROMETHAMINE 15 MG: 30 INJECTION, SOLUTION INTRAMUSCULAR at 21:29

## 2020-11-29 RX ADMIN — SODIUM CHLORIDE 1000 ML: 9 INJECTION, SOLUTION INTRAVENOUS at 20:00

## 2020-11-29 RX ADMIN — IOPAMIDOL 75 ML: 755 INJECTION, SOLUTION INTRAVENOUS at 21:08

## 2020-11-29 RX ADMIN — INSULIN HUMAN 5 UNITS: 100 INJECTION, SOLUTION PARENTERAL at 23:04

## 2020-11-29 RX ADMIN — SODIUM CHLORIDE 1000 ML: 9 INJECTION, SOLUTION INTRAVENOUS at 18:44

## 2020-11-29 ASSESSMENT — PAIN SCALES - GENERAL
PAINLEVEL_OUTOF10: 6
PAINLEVEL_OUTOF10: 3
PAINLEVEL_OUTOF10: 0

## 2020-11-29 ASSESSMENT — ENCOUNTER SYMPTOMS
COLOR CHANGE: 0
SHORTNESS OF BREATH: 0
NAUSEA: 1
ABDOMINAL PAIN: 1
BACK PAIN: 0
VOMITING: 1

## 2020-11-29 NOTE — LETTER
[facility]  [address] [address]  Phone: [phone]    Patient: Karson Fournier  YOB: 1980  Date: 11/29/2020 Time: 11:08 PM    Leaving the Hospital Against Medical Advice    Chart #:[id number]    This will certify that I, the undersigned,    ______________________________________________________________________    A patient in the above named medical center, having requested discharge and removal from the medical [place] against the advice of my attending physician(s), hereby release the Emergency Department, its physicians, officers and employees, severally and individually, from any and all liability of any nature whatsoever for any injury or harm or complication of any kind that may result directly or indirectly, by reason of my terminating my stay as a patient from [facility], and hereby waive any and all rights of action I may now have or later acquire as a result of my voluntary departure from [facility] and the termination of my stay as a patient therein. This release is made with the full knowledge of the danger that may result from the action which I am taking.       Date:_______________________                         ___________________________                                                                                    Patient/Legal Representative    Witness:        ____________________________                          ___________________________  Nurse                                                                        Physician

## 2020-11-29 NOTE — ED NOTES
Pt arrived to the ED via EMS, pt states that she runs out of her insulin and does not have insurance to buy anymore pt states that she fell asleep last night before taking her lantus and woke up nausea and vomiting, pt's blood sugar upon arrival was 257, pt is alert and orient, vss afebrile      More Nieves RN  11/29/20 6728

## 2020-11-30 NOTE — PROGRESS NOTES
Pharmacy Medication Reconciliation Note     List of medications patient is currently taking is complete. Source of information:   1. Patient  2. EMR    Notes regarding home medications:   1. Patient received her Humalog insulin today before presenting to the ER. Pt missed her Lantus dose last night but reports that she is otherwise compliant with her insulin.       [facility] [place], Pharmacy Intern  11/29/2020 10:02 PM

## 2020-11-30 NOTE — ED NOTES
Patient had complaint of abdominal cramping 8/10 and nausea. LISA Retana aware. CT of abd/pelvis per provider verbal order.       Carl Montiel RN  11/29/20 1835

## 2020-11-30 NOTE — ED PROVIDER NOTES
[facility]      Pt Name: Chase De La Rosa  MRN: [id number]  [place] 1980  Date of evaluation: 11/29/2020  Provider: LISA Salinas    This patient was seen and evaluated by the attending physician Kenia Farrar MD.    [address] [place] [address]       Chief Complaint   Patient presents with    Hyperglycemia       CRITICAL CARE TIME   I performed a total Critical Care time of 31 minutes, excluding separately reportable procedures. There was a high probability of clinically significant/life threatening deterioration in the patient's condition which required my urgent intervention. Not limited to multiple reexaminations, discussions with attending physician and consultants. HISTORY OF PRESENT ILLNESS  (Location/Symptom, Timing/Onset, Context/Setting, Quality, Duration, Modifying Factors, Severity.)   Chase De La Rosa is a 41 y.o. female who presents to the emergency department complaining of hyperglycemia. Patient states that she accidentally went to bed early last night and missed her long-acting dose of insulin. Today she states she has been \"trying to keep up with her blood sugars\" but started having some vomiting this evening and could not keep down liquids so she came to the ER. She states that she has been in DKA once about 2 and half years ago. She was diagnosed as a diabetic approximately 17 years ago and identifies as a type I diabetic. No fevers no diarrhea. No chest pain or shortness of breath. No abdominal pain. Nursing Notes were reviewed and I agree. REVIEW OF SYSTEMS    (2-9 systems for level 4, 10 or more for level 5)     Review of Systems   Constitutional: Negative for fever. Respiratory: Negative for shortness of breath. Cardiovascular: Negative for chest pain. Gastrointestinal: Positive for abdominal pain, nausea and vomiting. Genitourinary: Negative for dysuria.    Musculoskeletal: Negative for back pain and neck pain. Skin: Negative for color change, rash and wound. Neurological: Negative for weakness and numbness. Psychiatric/Behavioral: Negative for agitation, behavioral problems and confusion. Except as noted above the remainder of the review of systems was reviewed and negative. PAST MEDICAL HISTORY         Diagnosis Date    Diabetes mellitus ([address]        SURGICAL HISTORY           Procedure Laterality Date     SECTION         CURRENT MEDICATIONS       Discharge Medication List as of 2020 11:11 PM      CONTINUE these medications which have NOT CHANGED    Details   insulin glargine (LANTUS SOLOSTAR) 100 UNIT/ML injection pen Inject 13 Units into the skin nightly, Disp-5 pen, R-0Print      insulin lispro (HUMALOG KWIKPEN) 100 UNIT/ML pen Inject 5 Units into the skin 3 times daily (before meals), Disp-5 pen, R-0Print             ALLERGIES     Lisinopril    FAMILY HISTORY     No family history on file. No family status information on file. SOCIAL HISTORY      reports that she has been smoking cigarettes. She has been smoking about 0.50 packs per day. She has never used smokeless tobacco. She reports current alcohol use. She reports that she does not use drugs. PHYSICAL EXAM    (up to 7 for level 4, 8 or more for level 5)     ED Triage Vitals [20 1812]   BP Temp Temp Source Pulse Resp SpO2 Height Weight   135/87 98.5 °F (36.9 °C) Oral 86 22 100 % -- 110 lb 7.2 oz (50.1 kg)       Physical Exam  Vitals signs and nursing note reviewed. Constitutional:       Appearance: Normal appearance. HENT:      Head: Normocephalic and atraumatic. Eyes:      Pupils: Pupils are equal, round, and reactive to light. Neck:      Musculoskeletal: Normal range of motion. Cardiovascular:      Rate and Rhythm: Normal rate. Pulses: Normal pulses. Pulmonary:      Effort: Pulmonary effort is normal. No respiratory distress. Abdominal:      Tenderness:  There is no abdominal [facility]  [address] [facility]    Phone [phone]   URINE RT REFLEX TO CULTURE - Abnormal; Notable for the following components:    Glucose, Ur >=1000 (*)     Ketones, Urine >=80 (*)     All other components within normal limits    Narrative:     Performed at:  [facility]  [address] [facility]    Phone [phone]   BETA-HYDROXYBUTYRATE - Abnormal; Notable for the following components:    Beta-Hydroxybutyrate 3.74 (*)     All other components within normal limits    Narrative:     Performed at:  [facility]  [address] [facility]    Phone [phone]   BASIC METABOLIC PANEL W/ REFLEX TO MG FOR LOW K - Abnormal; Notable for the following components:    Sodium 133 (*)     CO2 12 (*)     Anion Gap 22 (*)     Glucose 243 (*)     CREATININE <0.5 (*)     Calcium 7.8 (*)     All other components within normal limits    Narrative:     Adrianne Raman tel. [phone],  Chemistry results called to and read back byJavier Kamara RN. , 11/29/2020  22:52, by [employer]  Performed at:  [facility]  [address] [facility]    Phone [phone]   POCT GLUCOSE - Abnormal; Notable for the following components:    POC Glucose 257 (*)     All other components within normal limits    Narrative:     Performed at:  [facility]  [address] [facility]    Phone [phone]   POCT GLUCOSE - Abnormal; Notable for the following components:    POC Glucose 190 (*)     All other components within normal limits    Narrative:     Performed at:  [facility]  [address] [facility]    Phone [phone]   POCT GLUCOSE - Abnormal; Notable for the following components:    POC Glucose 239 (*)     All other components within normal limits    Narrative: Performed at:  [facility]  [address]   Phone [phone]   POCT GLUCOSE - Normal   HCG, SERUM, QUALITATIVE    Narrative:     Performed at:  [facility]  [address] [facility]   Phone [phone]       All other labs were within normal range or not returned as of this dictation. EMERGENCY DEPARTMENT COURSE and DIFFERENTIAL DIAGNOSIS/MDM:   Vitals:    Vitals:    11/29/20 1830 11/29/20 2100 11/29/20 2213 11/29/20 2312   BP:  123/65 (!) 153/89 136/86   Pulse:    105   Resp:    16   Temp:    98.8 °F (37.1 °C)   TempSrc:    Oral   SpO2: 100% 100% 100%    Weight:         Patient is afebrile not tachycardic not hypoxic. She reports missing 1 dose of her long-term insulin last night. She started having some vomiting today. She is able to tolerate p.o. and she is given several liters of fluid however on recheck of BMP her anion gap and CO2 are worsened. She is slightly acidotic. I think very strongly that the patient should be admitted and on an insulin drip with a D5. She has been in DKA before and understands the gravity. Expressed the gravity once again including the risk of coma death or permanent disability with worsening DKA. She continues to desire to wish to leave [address] She is alert and oriented x3 able to make her own decisions. She understands she can return at any time. CONSULTS:  None    PROCEDURES:  Procedures      FINAL IMPRESSION      1.  Diabetic ketoacidosis without coma associated with type 1 diabetes mellitus ([facility] [address]          DISPOSITION/PLAN   DISPOSITION Gardner 11/29/2020 11:00:37 PM      PATIENT REFERRED TO:  [facility]) Pre-Services  [phone]          DISCHARGE MEDICATIONS:  Discharge Medication List as of 11/29/2020 11:11 PM          (Please note that portions of this note were completed with a voice recognition program.  Efforts were made to edit the dictations but occasionally words are mis-transcribed.)    Markell Collins [place]  11/29/20 [address]  12/03/20 9571

## 2020-11-30 NOTE — ED NOTES
PA [facility] spoke to the patient regarding admission. Patient refused to stay due to  leaving town for work tomorrow. Patient alert and orientated x4. Patient read and verbalized understanding of AMA. Patient verbalized risk including death. Patient signed AMA. Patient walked out of ED with steady gait.  to meet her in the ED lobby.       Shilpa Dahl RN  11/29/20 3393

## 2020-11-30 NOTE — ED PROVIDER NOTES
I independently evaluated and obtained a history and physical on Kerry Cuenca. All diagnostic, treatment, and disposition assistants were made to myself in conjunction the advanced practice provider. For further details of this patient's emergency department encounter, please see the advanced practice provider's documentation. History: 25-year-old female with history of diabetes presents with complaints of nausea and vomiting that started earlier today as well as hyperglycemia. Patient states that she missed her dose of Lantus last night and fell asleep. States that she woke up today and has \" been trying to play catch up\". States she was feeling normal yesterday. No cough or sputum production. No fevers or chills. States she was trying to stay hydrated at home but had difficulty secondary to nausea vomiting. Denies any abdominal pain. No vaginal bleeding or discharge. No urinary complaints. Normal bowel movements. Physician Exam: Normocephalic, atraumatic, moist mucous membranes, regular rate and rhythm, lungs clear to auscultation bilaterally, abdomen soft nontender nondistended, skin warm and dry      Patient seen and evaluated. History and physical as above. Nontoxic and afebrile. Patient hyperglycemic and with some electrolyte abnormalities concerning for possible early DKA. Patient tolerating oral intake after IV fluid boluses antibiotics. Glucose has come down. Patient's pH 7.33. Beta hydroxybutyrate 3.74. Repeat BMP shows worsening CO2 at 12 with anion gap increased at the 22 and from 18. Recommended patient for admission for DKA. 11:00 PM EST  Patient notified of the worsening blood work and strongly recommended admission. Patient does not want to be admitted secondary to insurance reasons. Stressed the importance of managing DKA in the hospital.  The patient as decided to leave against medical advice.  The patient is awake and alert, non-intoxicated, non-suicidal, nonpsychotic. There is no medical condition that prevents or inhibits their understanding of the risks/benefits of their decision. The patient understands the risks and benefits of their decision and has been given the opportunity to ask questions about their medical condition.          Sinan Bermudez MD  [facility]  11/29/20  10:18 PM EST         Sinan Bermudez MD  11/29/20 8210       Sinan Bermudez MD  11/29/20 0884

## 2021-02-02 ENCOUNTER — HOSPITAL ENCOUNTER (EMERGENCY)
Age: 41
Discharge: HOME | End: 2021-02-02
Payer: COMMERCIAL

## 2021-02-02 VITALS
DIASTOLIC BLOOD PRESSURE: 60 MMHG | HEART RATE: 100 BPM | TEMPERATURE: 98.9 F | OXYGEN SATURATION: 98 % | SYSTOLIC BLOOD PRESSURE: 128 MMHG | HEIGHT: 61 IN | RESPIRATION RATE: 16 BRPM | WEIGHT: 115 LBS | BODY MASS INDEX: 21.71 KG/M2

## 2021-02-02 DIAGNOSIS — K52.9 GASTROENTERITIS: Primary | ICD-10-CM

## 2021-02-02 DIAGNOSIS — E86.0 DEHYDRATION: ICD-10-CM

## 2021-02-02 LAB
A/G RATIO: 1.9 (ref 1.1–2.2)
ALBUMIN SERPL-MCNC: 5 G/DL (ref 3.4–5)
ALP BLD-CCNC: 120 U/L (ref 40–129)
ALT SERPL-CCNC: 14 U/L (ref 10–40)
ANION GAP SERPL CALCULATED.3IONS-SCNC: 17 MMOL/L (ref 3–16)
AST SERPL-CCNC: 26 U/L (ref 15–37)
BACTERIA: ABNORMAL /HPF
BASOPHILS ABSOLUTE: 0.1 K/UL (ref 0–0.2)
BASOPHILS RELATIVE PERCENT: 0.9 %
BETA-HYDROXYBUTYRATE: 0.6 MMOL/L (ref 0–0.27)
BILIRUB SERPL-MCNC: 0.6 MG/DL (ref 0–1)
BILIRUBIN URINE: NEGATIVE
BLOOD, URINE: NEGATIVE
BUN BLDV-MCNC: 10 MG/DL (ref 7–20)
CALCIUM SERPL-MCNC: 9.9 MG/DL (ref 8.3–10.6)
CHLORIDE BLD-SCNC: 103 MMOL/L (ref 99–110)
CLARITY: CLEAR
CO2: 24 MMOL/L (ref 21–32)
COLOR: YELLOW
CREAT SERPL-MCNC: 0.7 MG/DL (ref 0.6–1.1)
EOSINOPHILS ABSOLUTE: 0 K/UL (ref 0–0.6)
EOSINOPHILS RELATIVE PERCENT: 0.1 %
EPITHELIAL CELLS, UA: 2 /HPF (ref 0–5)
GFR AFRICAN AMERICAN: >60
GFR NON-AFRICAN AMERICAN: >60
GLOBULIN: 2.7 G/DL
GLUCOSE BLD-MCNC: 87 MG/DL (ref 70–99)
GLUCOSE URINE: >=1000 MG/DL
HCG(URINE) PREGNANCY TEST: NEGATIVE
HCT VFR BLD CALC: 42.5 % (ref 36–48)
HEMOGLOBIN: 14.1 G/DL (ref 12–16)
HYALINE CASTS: 2 /LPF (ref 0–8)
KETONES, URINE: >=80 MG/DL
LEUKOCYTE ESTERASE, URINE: NEGATIVE
LIPASE: 32 U/L (ref 13–60)
LYMPHOCYTES ABSOLUTE: 1.1 K/UL (ref 1–5.1)
LYMPHOCYTES RELATIVE PERCENT: 6.7 %
MCH RBC QN AUTO: 32.6 PG (ref 26–34)
MCHC RBC AUTO-ENTMCNC: 33.2 G/DL (ref 31–36)
MCV RBC AUTO: 98.2 FL (ref 80–100)
MICROSCOPIC EXAMINATION: YES
MONOCYTES ABSOLUTE: 0.5 K/UL (ref 0–1.3)
MONOCYTES RELATIVE PERCENT: 3.5 %
NEUTROPHILS ABSOLUTE: 13.9 K/UL (ref 1.7–7.7)
NEUTROPHILS RELATIVE PERCENT: 88.8 %
NITRITE, URINE: NEGATIVE
PDW BLD-RTO: 13.1 % (ref 12.4–15.4)
PH UA: 6 (ref 5–8)
PLATELET # BLD: 410 K/UL (ref 135–450)
PMV BLD AUTO: 7.1 FL (ref 5–10.5)
POTASSIUM REFLEX MAGNESIUM: 4 MMOL/L (ref 3.5–5.1)
PROTEIN UA: 30 MG/DL
RBC # BLD: 4.33 M/UL (ref 4–5.2)
RBC UA: 1 /HPF (ref 0–4)
SODIUM BLD-SCNC: 144 MMOL/L (ref 136–145)
SPECIFIC GRAVITY UA: 1.02 (ref 1–1.03)
TOTAL PROTEIN: 7.7 G/DL (ref 6.4–8.2)
URINE REFLEX TO CULTURE: ABNORMAL
URINE TYPE: ABNORMAL
UROBILINOGEN, URINE: 1 E.U./DL
WBC # BLD: 15.7 K/UL (ref 4–11)
WBC UA: 1 /HPF (ref 0–5)

## 2021-02-02 PROCEDURE — 84703 CHORIONIC GONADOTROPIN ASSAY: CPT

## 2021-02-02 PROCEDURE — 96375 TX/PRO/DX INJ NEW DRUG ADDON: CPT

## 2021-02-02 PROCEDURE — 6360000002 HC RX W HCPCS: Performed by: GENERAL ACUTE CARE HOSPITAL

## 2021-02-02 PROCEDURE — 85025 COMPLETE CBC W/AUTO DIFF WBC: CPT

## 2021-02-02 PROCEDURE — 96374 THER/PROPH/DIAG INJ IV PUSH: CPT

## 2021-02-02 PROCEDURE — 83690 ASSAY OF LIPASE: CPT

## 2021-02-02 PROCEDURE — 2580000003 HC RX 258: Performed by: GENERAL ACUTE CARE HOSPITAL

## 2021-02-02 PROCEDURE — 80053 COMPREHEN METABOLIC PANEL: CPT

## 2021-02-02 PROCEDURE — 96361 HYDRATE IV INFUSION ADD-ON: CPT

## 2021-02-02 PROCEDURE — 82010 KETONE BODYS QUAN: CPT

## 2021-02-02 PROCEDURE — 99284 EMERGENCY DEPT VISIT MOD MDM: CPT

## 2021-02-02 PROCEDURE — 81001 URINALYSIS AUTO W/SCOPE: CPT

## 2021-02-02 PROCEDURE — 2500000003 HC RX 250 WO HCPCS: Performed by: GENERAL ACUTE CARE HOSPITAL

## 2021-02-02 RX ORDER — INSULIN GLARGINE 100 [IU]/ML
16 INJECTION, SOLUTION SUBCUTANEOUS NIGHTLY
Qty: 5 PEN | Refills: 0 | Status: SHIPPED | OUTPATIENT
Start: 2021-02-02

## 2021-02-02 RX ORDER — ONDANSETRON 4 MG/1
4 TABLET, ORALLY DISINTEGRATING ORAL EVERY 8 HOURS PRN
Qty: 20 TABLET | Refills: 0 | Status: SHIPPED | OUTPATIENT
Start: 2021-02-02

## 2021-02-02 RX ORDER — PROMETHAZINE HYDROCHLORIDE 25 MG/ML
12.5 INJECTION, SOLUTION INTRAMUSCULAR; INTRAVENOUS ONCE
Status: COMPLETED | OUTPATIENT
Start: 2021-02-02 | End: 2021-02-02

## 2021-02-02 RX ORDER — 0.9 % SODIUM CHLORIDE 0.9 %
2000 INTRAVENOUS SOLUTION INTRAVENOUS ONCE
Status: COMPLETED | OUTPATIENT
Start: 2021-02-02 | End: 2021-02-02

## 2021-02-02 RX ADMIN — PROMETHAZINE HYDROCHLORIDE 12.5 MG: 25 INJECTION INTRAMUSCULAR; INTRAVENOUS at 13:55

## 2021-02-02 RX ADMIN — SODIUM CHLORIDE 2000 ML: 9 INJECTION, SOLUTION INTRAVENOUS at 13:55

## 2021-02-02 RX ADMIN — FAMOTIDINE 20 MG: 10 INJECTION INTRAVENOUS at 13:55

## 2021-02-02 ASSESSMENT — PAIN DESCRIPTION - PAIN TYPE
TYPE: ACUTE PAIN
TYPE: ACUTE PAIN

## 2021-02-02 ASSESSMENT — PAIN SCALES - GENERAL: PAINLEVEL_OUTOF10: 4

## 2021-02-02 ASSESSMENT — PAIN DESCRIPTION - LOCATION: LOCATION: ABDOMEN

## 2021-02-02 NOTE — ED PROVIDER NOTES
[facility]        Pt Name: Asia Beavers  MRN: [id number]  [place] 1980  Date of evaluation: 2/2/2021  Provider: JAMIE Sandoval CNP  PCP: No primary care provider on file. HORACIO. I have evaluated this patient. My supervising physician was available for consultation. CHIEF COMPLAINT       Chief Complaint   Patient presents with    Abdominal Pain     pt brought in by [address] [place] [address] EMS c/o pt having abd pain with n/v and elevated bs since this morning       HISTORY OF PRESENT ILLNESS   (Location, Timing/Onset, Context/Setting, Quality, Duration, Modifying Factors, Severity, Associated Signs and Symptoms)  Note limiting factors. Asia Beavers is a 41 y.o. female who presents to the emergency department today via EMS from home for evaluation of generalized abdominal pain and vomiting which began this morning. Patient states that her blood sugar was also elevated. Patient states that she has history of type 1 diabetes. She states that she did run out of her insulin pen. She denies recent travel or known sick contacts. She states that she is only been in DKA a 2 times. She denies having any diarrhea. She denies cough. She denies chest pain or trouble breathing. She denies having any back pain. She denies any urinary symptoms. She denies vaginal symptoms. She has not taken anything for the symptoms. Patient states she feels as if she may be dehydrated. Nursing Notes were all reviewed and agreed with or any disagreements were addressed in the HPI. REVIEW OF SYSTEMS    (2-9 systems for level 4, 10 or more for level 5)     Review of Systems   Constitutional: Negative for chills and fever. HENT: Negative for congestion and sore throat. Eyes: Negative for visual disturbance. Respiratory: Negative for chest tightness and shortness of breath. Cardiovascular: Negative for chest pain and palpitations. Gastrointestinal: Positive for abdominal pain, nausea and vomiting. Negative for blood in stool, constipation and diarrhea. Endocrine: Negative for polydipsia and polyuria. Genitourinary: Negative for difficulty urinating, dysuria and vaginal pain. Musculoskeletal: Negative for arthralgias, back pain, neck pain and neck stiffness. Skin: Negative for rash and wound. Allergic/Immunologic: Negative for immunocompromised state. Neurological: Negative for dizziness, weakness, light-headedness and headaches. Hematological: Does not bruise/bleed easily. Psychiatric/Behavioral: Negative for suicidal ideas. Positives and Pertinent negatives as per HPI. Except as noted above in the ROS, all other systems were reviewed and negative. PAST MEDICAL HISTORY     Past Medical History:   Diagnosis Date    Diabetes mellitus ([facility] [address]          SURGICAL HISTORY     Past Surgical History:   Procedure Laterality Date     SECTION           [address]       Discharge Medication List as of 2021  3:55 PM      CONTINUE these medications which have NOT CHANGED    Details   insulin lispro (HUMALOG KWIKPEN) 100 UNIT/ML pen Inject 5 Units into the skin 3 times daily (before meals), Disp-5 pen, R-0Print               ALLERGIES     Lisinopril    FAMILYHISTORY     History reviewed. No pertinent family history. SOCIAL HISTORY       Social History     Tobacco Use    Smoking status: Current Every Day Smoker     Packs/day: 0.50     Types: Cigarettes    Smokeless tobacco: Never Used   Substance Use Topics    Alcohol use: Yes     Comment: occasional    Drug use: Yes     Types: Marijuana       SCREENINGS             PHYSICAL EXAM    (up to 7 for level 4, 8 or more for level 5)     ED Triage Vitals [21 1310]   BP Temp Temp Source Pulse Resp SpO2 Height Weight   136/73 98.9 °F (37.2 °C) Oral 90 18 98 % 5' 1\" (1.549 m) 115 lb (52.2 kg)       Physical Exam  Vitals signs and nursing note reviewed. Constitutional:       General: She is not in acute distress. Appearance: Normal appearance. She is well-developed. She is not ill-appearing, toxic-appearing or diaphoretic. HENT:      Head: Normocephalic and atraumatic. Right Ear: External ear normal.      Left Ear: External ear normal.      Nose: Nose normal.      Mouth/Throat:      Mouth: Mucous membranes are moist.   Eyes:      General:         Right eye: No discharge. Left eye: No discharge. Extraocular Movements: Extraocular movements intact. Pupils: Pupils are equal, round, and reactive to light. Neck:      Musculoskeletal: Normal range of motion and neck supple. Cardiovascular:      Rate and Rhythm: Normal rate and regular rhythm. Pulmonary:      Effort: Pulmonary effort is normal. No respiratory distress. Abdominal:      General: Abdomen is flat. Bowel sounds are normal.      Tenderness: There is no abdominal tenderness. Skin:     General: Skin is warm and dry. Capillary Refill: Capillary refill takes less than 2 seconds. Neurological:      General: No focal deficit present. Mental Status: She is alert and oriented to person, place, and time.    Psychiatric:         Mood and Affect: Mood normal.         Behavior: Behavior normal.         DIAGNOSTIC RESULTS   LABS:    Labs Reviewed   CBC WITH AUTO DIFFERENTIAL - Abnormal; Notable for the following components:       Result Value    WBC 15.7 (*)     Neutrophils Absolute 13.9 (*)     All other components within normal limits    Narrative:     Performed at:  [facility] [address], [place], [facility] [employer]   Phone [phone]   COMPREHENSIVE METABOLIC PANEL W/ REFLEX TO MG FOR LOW K - Abnormal; Notable for the following components:    Anion Gap 17 (*)     All other components within normal limits    Narrative:     Performed at:  [facility]  [address] [facility] [employer]   Phone [phone]   URINE RT REFLEX TO CULTURE - Abnormal; Notable for the following components:    Glucose, Ur >=1000 (*)     Ketones, Urine >=80 (*)     Protein, UA 30 (*)     All other components within normal limits    Narrative:     Performed at:  [facility] [address] [employer]   Phone [phone]   BETA-HYDROXYBUTYRATE - Abnormal; Notable for the following components:    Beta-Hydroxybutyrate 0.60 (*)     All other components within normal limits    Narrative:     Performed at:  [facility] [address] [place]   Phone [phone] - Abnormal; Notable for the following components:    Bacteria, UA 1+ (*)     All other components within normal limits    Narrative:     Performed at:  [facility] [address] [place]   Phone [phone]   LIPASE    Narrative:     Performed at:  [facility] [address] [place]   Phone [phone]   PREGNANCY, URINE    Narrative:     Performed at:  [facility]   Phone [phone]       All other labs were within normal range or not returned as of this dictation. EKG: All EKG's are interpreted by the Emergency Department Physician in the absence of a cardiologist.  Please see their note for interpretation of EKG. RADIOLOGY:   Non-plain film images such as CT, Ultrasound and MRI are read by the radiologist. Plain radiographic images are visualized and preliminarily interpreted by the ED Provider with the below findings:        Interpretation per the Radiologist below, if available at the time of this note:    No orders to display     No results found.         PROCEDURES   Unless otherwise noted below, none     Procedures    CRITICAL CARE TIME N/A    CONSULTS:  None      EMERGENCY DEPARTMENT COURSE and DIFFERENTIAL DIAGNOSIS/MDM:   Vitals:    Vitals:    02/02/21 1310 02/02/21 1511   BP: 136/73 128/60   Pulse: 90 100   Resp: 18 16   Temp: 98.9 °F (37.2 °C)    TempSrc: Oral    SpO2: 98% 98%   Weight: 115 lb (52.2 kg)    Height: 5' 1\" (1.549 m)        Patient was given the following medications:  Medications   promethazine (PHENERGAN) injection 12.5 mg (12.5 mg Intravenous Given 2/2/21 1355)   famotidine (PEPCID) injection 20 mg (20 mg Intravenous Given 2/2/21 1355)   0.9 % sodium chloride bolus (0 mLs Intravenous Stopped 2/2/21 1617)         Nursing notes reviewed. This is a 47-year-old female who presents to the emergency department today from home for evaluation of generalized abdominal pain and vomiting which began this morning. Patient ports history of type 1 diabetes and states that she is concerned for possible dehydration. Physical exam complete. Patient arrives nontoxic, afebrile, normotensive. No signs or symptoms of acute distress noted. Patient is medicated with IV normal saline 2 L, IV Phenergan, and IV Pepcid. Laboratory studies reassuring. No evidence of DKA. Patient reassessed and does report significant relief of symptoms after intervention. At this time there is no evidence of any life-threatening or emergent conditions requiring immediate intervention. Patient presents with gastroenteritis and dehydration. She is encouraged to increase her fluid intake and consume bland diet for the next several days. Prescription for Zofran provided. She agrees to follow-up with her PCP as directed within the next 2 to 3 days. She agrees return to nearest ED for high fever, incessant vomiting, severe pain, any other worsening symptoms. She is discharged home in stable condition. FINAL IMPRESSION      1. Gastroenteritis    2.  Dehydration          DISPOSITION/PLAN   DISPOSITION Decision To Discharge 02/02/2021 04:18:53 PM      PATIENT

## 2021-02-05 ASSESSMENT — ENCOUNTER SYMPTOMS
CHEST TIGHTNESS: 0
BACK PAIN: 0
SORE THROAT: 0
ABDOMINAL PAIN: 1
BLOOD IN STOOL: 0
CONSTIPATION: 0
SHORTNESS OF BREATH: 0
NAUSEA: 1
DIARRHEA: 0
VOMITING: 1

## 2021-03-16 ENCOUNTER — NURSE ONLY (OUTPATIENT)
Dept: PRIMARY CARE CLINIC | Age: 41
End: 2021-03-16
Payer: COMMERCIAL

## 2021-03-16 DIAGNOSIS — Z23 HIGH PRIORITY FOR COVID-19 VIRUS VACCINATION: Primary | ICD-10-CM

## 2021-03-16 PROCEDURE — 91300 COVID-19, PFIZER VACCINE 30MCG/0.3ML DOSE: CPT

## 2021-03-16 PROCEDURE — 0001A COVID-19, PFIZER VACCINE 30MCG/0.3ML DOSE: CPT

## 2021-04-06 ENCOUNTER — NURSE ONLY (OUTPATIENT)
Dept: PRIMARY CARE CLINIC | Age: 41
End: 2021-04-06
Payer: COMMERCIAL

## 2021-04-06 DIAGNOSIS — Z23 HIGH PRIORITY FOR COVID-19 VIRUS VACCINATION: Primary | ICD-10-CM

## 2021-04-06 PROCEDURE — 91300 COVID-19, PFIZER VACCINE 30MCG/0.3ML DOSE: CPT | Performed by: NURSE PRACTITIONER

## 2021-04-06 PROCEDURE — 0002A COVID-19, PFIZER VACCINE 30MCG/0.3ML DOSE: CPT | Performed by: NURSE PRACTITIONER
